# Patient Record
Sex: MALE | Race: WHITE | NOT HISPANIC OR LATINO | Employment: OTHER | URBAN - METROPOLITAN AREA
[De-identification: names, ages, dates, MRNs, and addresses within clinical notes are randomized per-mention and may not be internally consistent; named-entity substitution may affect disease eponyms.]

---

## 2017-03-20 ENCOUNTER — OFFICE VISIT - HEALTHEAST (OUTPATIENT)
Dept: FAMILY MEDICINE | Facility: CLINIC | Age: 57
End: 2017-03-20

## 2017-03-20 DIAGNOSIS — E66.9 OBESITY, UNSPECIFIED: ICD-10-CM

## 2017-03-20 DIAGNOSIS — Z12.5 PROSTATE CANCER SCREENING: ICD-10-CM

## 2017-03-20 DIAGNOSIS — N20.0 KIDNEY STONE: ICD-10-CM

## 2017-03-20 DIAGNOSIS — Z00.00 ROUTINE GENERAL MEDICAL EXAMINATION AT A HEALTH CARE FACILITY: ICD-10-CM

## 2017-03-20 DIAGNOSIS — E78.00 PURE HYPERCHOLESTEROLEMIA: ICD-10-CM

## 2017-03-20 ASSESSMENT — MIFFLIN-ST. JEOR: SCORE: 2049.57

## 2017-03-21 LAB
CHOLEST SERPL-MCNC: 231 MG/DL
FASTING STATUS PATIENT QL REPORTED: YES
HDLC SERPL-MCNC: 50 MG/DL
LDLC SERPL CALC-MCNC: 143 MG/DL
PSA SERPL-MCNC: 0.9 NG/ML (ref 0–3.5)
TRIGL SERPL-MCNC: 192 MG/DL

## 2017-03-22 ENCOUNTER — COMMUNICATION - HEALTHEAST (OUTPATIENT)
Dept: FAMILY MEDICINE | Facility: CLINIC | Age: 57
End: 2017-03-22

## 2017-11-03 ENCOUNTER — RECORDS - HEALTHEAST (OUTPATIENT)
Dept: ADMINISTRATIVE | Facility: OTHER | Age: 57
End: 2017-11-03

## 2020-06-10 ENCOUNTER — COMMUNICATION - HEALTHEAST (OUTPATIENT)
Dept: SCHEDULING | Facility: CLINIC | Age: 60
End: 2020-06-10

## 2020-07-13 ENCOUNTER — AMBULATORY - HEALTHEAST (OUTPATIENT)
Dept: NURSING | Facility: CLINIC | Age: 60
End: 2020-07-13

## 2020-07-13 ENCOUNTER — OFFICE VISIT - HEALTHEAST (OUTPATIENT)
Dept: FAMILY MEDICINE | Facility: CLINIC | Age: 60
End: 2020-07-13

## 2020-07-13 DIAGNOSIS — T14.8XXA PUNCTURE WOUND: ICD-10-CM

## 2020-07-13 DIAGNOSIS — S99.929A FOOT INJURY: ICD-10-CM

## 2020-08-18 ENCOUNTER — COMMUNICATION - HEALTHEAST (OUTPATIENT)
Dept: FAMILY MEDICINE | Facility: CLINIC | Age: 60
End: 2020-08-18

## 2020-08-24 ENCOUNTER — OFFICE VISIT - HEALTHEAST (OUTPATIENT)
Dept: FAMILY MEDICINE | Facility: CLINIC | Age: 60
End: 2020-08-24

## 2020-08-24 DIAGNOSIS — I10 BENIGN ESSENTIAL HYPERTENSION: ICD-10-CM

## 2020-08-24 DIAGNOSIS — K42.9 UMBILICAL HERNIA WITHOUT OBSTRUCTION AND WITHOUT GANGRENE: ICD-10-CM

## 2020-08-24 DIAGNOSIS — Z13.1 SCREENING FOR DIABETES MELLITUS: ICD-10-CM

## 2020-08-24 DIAGNOSIS — E78.5 HYPERLIPIDEMIA LDL GOAL <100: ICD-10-CM

## 2020-08-24 DIAGNOSIS — Z00.01 ENCOUNTER FOR ROUTINE ADULT HEALTH EXAMINATION WITH ABNORMAL FINDINGS: ICD-10-CM

## 2020-08-24 DIAGNOSIS — Z11.59 ENCOUNTER FOR HEPATITIS C SCREENING TEST FOR LOW RISK PATIENT: ICD-10-CM

## 2020-08-24 LAB
ALBUMIN SERPL-MCNC: 4.3 G/DL (ref 3.5–5)
ALP SERPL-CCNC: 38 U/L (ref 45–120)
ALT SERPL W P-5'-P-CCNC: 16 U/L (ref 0–45)
ANION GAP SERPL CALCULATED.3IONS-SCNC: 11 MMOL/L (ref 5–18)
AST SERPL W P-5'-P-CCNC: 17 U/L (ref 0–40)
BILIRUB SERPL-MCNC: 0.5 MG/DL (ref 0–1)
BUN SERPL-MCNC: 17 MG/DL (ref 8–22)
CALCIUM SERPL-MCNC: 9.4 MG/DL (ref 8.5–10.5)
CHLORIDE BLD-SCNC: 105 MMOL/L (ref 98–107)
CHOLEST SERPL-MCNC: 210 MG/DL
CO2 SERPL-SCNC: 24 MMOL/L (ref 22–31)
CREAT SERPL-MCNC: 1.01 MG/DL (ref 0.7–1.3)
FASTING STATUS PATIENT QL REPORTED: YES
GFR SERPL CREATININE-BSD FRML MDRD: >60 ML/MIN/1.73M2
GLUCOSE BLD-MCNC: 92 MG/DL (ref 70–125)
HDLC SERPL-MCNC: 54 MG/DL
LDLC SERPL CALC-MCNC: 125 MG/DL
POTASSIUM BLD-SCNC: 4.3 MMOL/L (ref 3.5–5)
PROT SERPL-MCNC: 7.3 G/DL (ref 6–8)
SODIUM SERPL-SCNC: 140 MMOL/L (ref 136–145)
TRIGL SERPL-MCNC: 154 MG/DL

## 2020-08-24 ASSESSMENT — MIFFLIN-ST. JEOR: SCORE: 1975.85

## 2020-08-25 LAB — HCV AB SERPL QL IA: NEGATIVE

## 2020-08-30 ENCOUNTER — COMMUNICATION - HEALTHEAST (OUTPATIENT)
Dept: FAMILY MEDICINE | Facility: CLINIC | Age: 60
End: 2020-08-30

## 2020-09-28 ENCOUNTER — OFFICE VISIT - HEALTHEAST (OUTPATIENT)
Dept: FAMILY MEDICINE | Facility: CLINIC | Age: 60
End: 2020-09-28

## 2020-09-28 DIAGNOSIS — I10 ESSENTIAL HYPERTENSION, BENIGN: ICD-10-CM

## 2020-09-28 DIAGNOSIS — Z23 NEED FOR VACCINATION: ICD-10-CM

## 2020-09-28 RX ORDER — LOSARTAN POTASSIUM 50 MG/1
50 TABLET ORAL DAILY
Qty: 90 TABLET | Refills: 3 | Status: SHIPPED | OUTPATIENT
Start: 2020-09-28 | End: 2021-08-25

## 2020-09-28 ASSESSMENT — MIFFLIN-ST. JEOR: SCORE: 1969.05

## 2020-12-28 ENCOUNTER — OFFICE VISIT - HEALTHEAST (OUTPATIENT)
Dept: FAMILY MEDICINE | Facility: CLINIC | Age: 60
End: 2020-12-28

## 2020-12-28 DIAGNOSIS — Z23 NEED FOR VACCINATION: ICD-10-CM

## 2020-12-28 DIAGNOSIS — N52.9 ERECTILE DYSFUNCTION, UNSPECIFIED ERECTILE DYSFUNCTION TYPE: ICD-10-CM

## 2020-12-28 DIAGNOSIS — I10 BENIGN ESSENTIAL HYPERTENSION: ICD-10-CM

## 2020-12-28 LAB
ANION GAP SERPL CALCULATED.3IONS-SCNC: 10 MMOL/L (ref 5–18)
BUN SERPL-MCNC: 20 MG/DL (ref 8–22)
CALCIUM SERPL-MCNC: 9.5 MG/DL (ref 8.5–10.5)
CHLORIDE BLD-SCNC: 107 MMOL/L (ref 98–107)
CO2 SERPL-SCNC: 25 MMOL/L (ref 22–31)
CREAT SERPL-MCNC: 1.16 MG/DL (ref 0.7–1.3)
GFR SERPL CREATININE-BSD FRML MDRD: >60 ML/MIN/1.73M2
GLUCOSE BLD-MCNC: 72 MG/DL (ref 70–125)
POTASSIUM BLD-SCNC: 4.7 MMOL/L (ref 3.5–5)
SODIUM SERPL-SCNC: 142 MMOL/L (ref 136–145)

## 2020-12-28 RX ORDER — TADALAFIL 20 MG/1
20 TABLET ORAL DAILY PRN
Qty: 12 TABLET | Refills: 3 | Status: SHIPPED | OUTPATIENT
Start: 2020-12-28 | End: 2021-08-25

## 2020-12-31 ENCOUNTER — COMMUNICATION - HEALTHEAST (OUTPATIENT)
Dept: FAMILY MEDICINE | Facility: CLINIC | Age: 60
End: 2020-12-31

## 2021-04-13 ENCOUNTER — AMBULATORY - HEALTHEAST (OUTPATIENT)
Dept: NURSING | Facility: CLINIC | Age: 61
End: 2021-04-13

## 2021-05-04 ENCOUNTER — AMBULATORY - HEALTHEAST (OUTPATIENT)
Dept: NURSING | Facility: CLINIC | Age: 61
End: 2021-05-04

## 2021-05-30 VITALS — WEIGHT: 259 LBS | BODY MASS INDEX: 33.24 KG/M2 | HEIGHT: 74 IN

## 2021-06-04 VITALS
RESPIRATION RATE: 16 BRPM | HEIGHT: 74 IN | HEART RATE: 59 BPM | OXYGEN SATURATION: 100 % | DIASTOLIC BLOOD PRESSURE: 98 MMHG | SYSTOLIC BLOOD PRESSURE: 148 MMHG | WEIGHT: 244.5 LBS | TEMPERATURE: 98 F | BODY MASS INDEX: 31.38 KG/M2

## 2021-06-05 VITALS
HEART RATE: 80 BPM | OXYGEN SATURATION: 98 % | WEIGHT: 254.3 LBS | BODY MASS INDEX: 33.1 KG/M2 | DIASTOLIC BLOOD PRESSURE: 88 MMHG | SYSTOLIC BLOOD PRESSURE: 138 MMHG

## 2021-06-05 VITALS
DIASTOLIC BLOOD PRESSURE: 92 MMHG | BODY MASS INDEX: 31.18 KG/M2 | HEIGHT: 74 IN | OXYGEN SATURATION: 98 % | HEART RATE: 81 BPM | WEIGHT: 243 LBS | SYSTOLIC BLOOD PRESSURE: 140 MMHG

## 2021-06-09 NOTE — PROGRESS NOTES
"Tim Rutherford is a 60 y.o. male who is being evaluated via a billable telephone visit.      The patient has been notified of following:     \"This telephone visit will be conducted via a call between you and your physician/provider. We have found that certain health care needs can be provided without the need for a physical exam.  This service lets us provide the care you need with a short phone conversation.  If a prescription is necessary we can send it directly to your pharmacy.  If lab work is needed we can place an order for that and you can then stop by our lab to have the test done at a later time.    Telephone visits are billed at different rates depending on your insurance coverage. During this emergency period, for some insurers they may be billed the same as an in-person visit.  Please reach out to your insurance provider with any questions.    If during the course of the call the physician/provider feels a telephone visit is not appropriate, you will not be charged for this service.\"    Patient has given verbal consent to a Telephone visit? Yes    What phone number would you like to be contacted at? [phone]    Patient would like to receive their AVS by Mail    Additional provider notes:        SUBJECTIVE:  Tim Rutherford is a 60 y.o. male  who presents for foot injury.     The patient stepped onto a board that had 2.5 inch spike in it. It went through the arch on his foot. They did have some redness around the puncture wound, that is getting smaller. He can put more weight on this now than he could. His foot is somewhat swollen which is why they are calling. They are wondering about an antibiotic to help with this. It feels better today than the last two days. He has had no swelling into the leg. He has had no fevers. He has had no body aches.   Chief Complaint   Patient presents with     Foot Injury     On Friday patient stepped on a board that had a 2.5\" spike attached to it. The spike went " through his shoe and into the center of his right foot. The spike was removed. Patient reports that the redness and swelling has gone down since Friday. No fevers and no discharge.          Patient Active Problem List   Diagnosis     Obesity     Essential Hypercholesterolemia     Kidney stone     Elevated BP       Current Outpatient Medications   Medication Sig Dispense Refill     multivit-min/FA/lycopen/lutein (CENTRUM SILVER MEN ORAL) Take by mouth daily.       cephalexin (KEFLEX) 500 MG capsule Take 1 capsule (500 mg total) by mouth 3 (three) times a day for 10 days. 30 capsule 0     No current facility-administered medications for this visit.        Social History     Tobacco Use   Smoking Status Never Smoker   Smokeless Tobacco Never Used           OBJECTIVE:        No results found for this or any previous visit (from the past 240 hour(s)).    There were no vitals filed for this visit.  No data recorded       Assessment/Plan:  1. Puncture wound  -Discussed that recommendation is to at least update his tetanus as we only have 2 documented tetanus shots in the system. 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   -f/u if worsening.   - Tdap vaccine,  8yo or older,  IM; Future  - cephalexin (KEFLEX) 500 MG capsule; Take 1 capsule (500 mg total) by mouth 3 (three) times a day for 10 days.  Dispense: 30 capsule; Refill: 0        Phone call duration:  10 minutes    Trinity Young MD

## 2021-06-09 NOTE — PROGRESS NOTES
"   Visit Vitals     BP (!) 150/100     Pulse 84     Resp 16     Ht 6' 2\" (1.88 m)     Wt (!) 259 lb (117.5 kg)     BMI 33.25 kg/m2       Tim Rutherford is a 56 y.o. male here for physical.  He has no specific concerns about his health.  He is doing some walking but knows that his weight is up and he needs to work harder at diet exercise and weight loss.  We reviewed his elevated blood pressure here today and the potential need for starting medications.  Encouraged him on blood pressure monitoring at least weekly while working at low-salt diet.  We reviewed heart disease prevention and cancer detection.  He is having no prostate symptoms.  Active Ambulatory Problems     Diagnosis Date Noted     Obesity      Essential Hypercholesterolemia      Kidney stone 03/20/2017     Elevated BP 03/20/2017     Resolved Ambulatory Problems     Diagnosis Date Noted     No Resolved Ambulatory Problems     Past Medical History:   Diagnosis Date     Essential hypertension, malignant      Hypercholesterolemia      Nasal septal deviation      History reviewed. No pertinent surgical history.  Family History   Problem Relation Age of Onset     Alcohol abuse Paternal Grandfather      Hyperlipidemia Mother      Glaucoma Mother      Leukemia Daughter      No current outpatient prescriptions on file.  No Known Allergies  Immunization History   Administered Date(s) Administered     Td, historic 02/20/2007     Tdap 02/20/2007, 03/20/2017     Social History     Social History     Marital status:      Spouse name: N/A     Number of children: N/A     Years of education: N/A     Occupational History     Not on file.     Social History Main Topics     Smoking status: Not on file     Smokeless tobacco: Not on file     Alcohol use Not on file     Drug use: Not on file     Sexual activity: Not on file     Other Topics Concern     Not on file     Social History Narrative     No narrative on file     Habits: He is a non-smoker rare alcohol user " does use caffeine about 3 times a day  His review of systems all otherwise negative    General Appearance:    Alert, cooperative, no distress, appears stated age   Head:    Normocephalic, without obvious abnormality, atraumatic   Eyes:    PERRL, conjunctiva/corneas clear, EOM's intact, fundi     benign, both eyes        Ears:    Normal TM's and external ear canals, both ears   Nose:   Nares normal, septum midline, mucosa normal, no drainage    or sinus tenderness   Throat:   Lips, mucosa, and tongue normal; teeth and gums normal   Neck:   Supple, symmetrical, trachea midline, no adenopathy;        thyroid:  No enlargement/tenderness/nodules; no carotid    bruit or JVD   Back:     Symmetric, no curvature, ROM normal, no CVA tenderness   Lungs:     Clear to auscultation bilaterally, respirations unlabored   Chest wall:    No tenderness or deformity   Heart:    Regular rate and rhythm, S1 and S2 normal, no murmur, rub   or gallop   Abdomen:     Soft, non-tender, bowel sounds active all four quadrants,     no masses, no organomegaly   Genitalia:    Normal male without hernia or other abnormality    Rectal:    Normal tone, mild prostate enlargement otherwise normal    Extremities:   Extremities normal, atraumatic, no cyanosis or edema   Pulses:   2+ and symmetric all extremities   Skin:   Skin color, texture, turgor normal, no rashes or lesions   Lymph nodes:   Cervical, supraclavicular, and axillary nodes normal   Neurologic:   CNII-XII intact. Normal strength, sensation and reflexes       throughout    Labs pending    Assessment: General physical elevated blood pressure hypercholesterolemia obesity history of kidney stones    Plan: Okay for tetanus update we will call with labs when available I have asked him to monitor blood pressures at least weekly and follow-up here in 1 month if his blood pressure remains high he will need to start medication and he is aware that.  Work at diet exercise and weight loss and  follow-up here otherwise in 6 months if his blood pressure remains an issue.

## 2021-06-10 NOTE — TELEPHONE ENCOUNTER
Who is calling:  wife  Reason for Call:  Calling regarding some chagres for a phone conversation that was not supposed to be charged to the patient. Please advise.  Date of last appointment with primary care: 07/13/20  Okay to leave a detailed message: Yes

## 2021-06-10 NOTE — TELEPHONE ENCOUNTER
Virtual visit 7/13/20. Pt told to come in for Tdap vaccine. Pt though the virtual visit would not be charged. Pt was notified that he completed a virtual visit and it is billed as that. Vaccine was additional stop into the clinic.     Wife had additional questions as to why other family members have had lower visit costs, virtually. I told her it depends on the insurance coverage and other pt's situations.

## 2021-06-10 NOTE — PROGRESS NOTES
Assessment:      Healthy male exam.      1. Encounter for routine adult health examination with abnormal findings  The patient is retired  Walks most days 3 miles  Due for colon cancer screening 2021, 10-year plan    2. Encounter for hepatitis C screening test for low risk patient     - Hepatitis C Antibody (Anti-HCV)    3. Hyperlipidemia LDL goal <100  Last set of lipids 2017: 231/192/50/143  Patient has never been on medication for hyperlipidemia  No significant family history for premature cardiovascular disease    - Lipid [place] FASTING    4. Screening for diabetes mellitus     - Comprehensive Metabolic Panel    5. Benign essential hypertension  In 2017 the patient's blood pressure in the office was 140/100 with a plan for follow-up in the clinic to document further blood pressures as patient felt that he had whitecoat hypertension but failed to follow-up.  The patient states that about a year ago he had a life insurance physical exam and blood pressure was satisfactory when checked at home.  He has the ability to check his blood pressure at home and will have him do that and in about a month return to the clinic with his blood pressure unit and we will look at those blood pressure readings, compare his monitor to ours the results and images vision on starting medication.  Low-salt diet, continued efforts to exercise and goal of losing 20 pounds would be helpful.    6. Umbilical hernia without obstruction and without gangrene  Incidental finding of umbilical hernia which is small reducible and discussed with him at this point this needs no attention but if gets larger or symptomatic or if signs of strangulation or incarceration of the hernia and its an urgent visit to the emergency room.     Plan:       All questions answered.  Diagnosis explained in detail, including differential.  Discussed healthy lifestyle modifications.  Test results by letter     Subjective:      Tim Rutherford is a 60 y.o. male who  presents for an annual exam. The patient reports that there is not domestic violence in his life.     Healthy Habits:   Regular Exercise: Yes  Sunscreen Use: No  Healthy Diet: Yes  Dental Visits Regularly: Yes  Seat Belt: Yes  Sexually active: Yes  Monthly Self Testicular Exams:  Yes  Hemoccults: No  Flex Sig: No  Colonoscopy: Yes  Lipid Profile: Yes  Glucose Screen: Yes  Prevention of Osteoporosis: Yes  Last Dexa: No  Guns at Home:  No      Immunization History   Administered Date(s) Administered     Td,adult,historic,unspecified 02/20/2007     Tdap 02/20/2007, 03/20/2017, 07/13/2020     Immunization status: Discussed the shingles vaccine and he is going to check with his insurance to see if covered and if so then will give in the clinic       Current Outpatient Medications   Medication Sig Dispense Refill     multivit-min/FA/lycopen/lutein (CENTRUM SILVER MEN ORAL) Take by mouth daily.       No current facility-administered medications for this visit.      Past Medical History:   Diagnosis Date     Essential hypertension, malignant      Hypercholesterolemia      Nasal septal deviation      History reviewed. No pertinent surgical history.  Patient has no known allergies.  Family History   Problem Relation Age of Onset     Alcohol abuse Paternal Grandfather      Hyperlipidemia Mother      Glaucoma Mother      No Medical Problems Father         bladder cancer     Lymphoma Daughter         hodgkins     Social History     Socioeconomic History     Marital status:      Spouse name: Not on file     Number of children: Not on file     Years of education: Not on file     Highest education level: Not on file   Occupational History     Occupation: commercial construction     Employer: [employer]     Comment: retired   Social Needs     Financial resource strain: Not on file     Food insecurity     Worry: Not on file     Inability: Not on file     Transportation needs     Medical: Not on file     Non-medical: Not on  "file   Tobacco Use     Smoking status: Never Smoker     Smokeless tobacco: Never Used   Substance and Sexual Activity     Alcohol use: Not on file     Comment: less than 1 beer/week     Drug use: Never     Sexual activity: Yes     Partners: Female   Lifestyle     Physical activity     Days per week: 7 days     Minutes per session: 40 min     Stress: Not on file   Relationships     Social connections     Talks on phone: Not on file     Gets together: Not on file     Attends [employer] service: Not on file     Active member of club or organization: Not on file     Attends meetings of clubs or organizations: Not on file     Relationship status: Not on file     Intimate partner violence     Fear of current or ex partner: Not on file     Emotionally abused: Not on file     Physically abused: Not on file     Forced sexual activity: Not on file   Other Topics Concern     Not on file   Social History Narrative     Not on file       Review of Systems  Review of Systems   Constitutional: Negative.    HENT: Negative.    Eyes: Negative.    Respiratory: Negative.    Cardiovascular: Negative.    Gastrointestinal: Negative.    Endocrine: Negative.    Genitourinary: Negative.    Musculoskeletal: Negative.    Skin: Negative.    Allergic/Immunologic: Negative.    Neurological: Negative.    Hematological: Negative.    Psychiatric/Behavioral: Negative.              Objective:     Vitals:    08/24/20 0919 08/24/20 0923 08/24/20 0930   BP: (!) 160/100 (!) 160/100 (!) 148/98   Pulse: (!) 59     Resp: 16     Temp: 98  F (36.7  C)     SpO2: 100%     Weight: (!) 244 lb 8 oz (110.9 kg)     Height: 6' 1.5\" (1.867 m)       Body mass index is 31.82 kg/m .    Physical  Physical Exam  Vitals signs and nursing note reviewed.   Constitutional:       General: He is not in acute distress.     Appearance: Normal appearance. He is not ill-appearing.   HENT:      Head: Normocephalic and atraumatic.      Right Ear: Tympanic membrane, ear canal and external " ear normal.      Left Ear: Tympanic membrane, ear canal and external ear normal.      Nose: Nose normal.      Mouth/Throat:      Mouth: Mucous membranes are moist.      Pharynx: No oropharyngeal exudate or posterior oropharyngeal erythema.   Eyes:      Extraocular Movements: Extraocular movements intact.      Conjunctiva/sclera: Conjunctivae normal.      Pupils: Pupils are equal, round, and reactive to light.   Neck:      Musculoskeletal: Normal range of motion.      Vascular: No carotid bruit.   Cardiovascular:      Rate and Rhythm: Normal rate and regular rhythm.      Pulses: Normal pulses.      Heart sounds: Normal heart sounds. No murmur.   Pulmonary:      Effort: Pulmonary effort is normal.      Breath sounds: Normal breath sounds.   Abdominal:      General: Bowel sounds are normal. There is no distension.      Palpations: Abdomen is soft. There is no mass.      Hernia: A hernia is present.      Comments: Small reducible central umbilical hernia nontender   Musculoskeletal: Normal range of motion.      Right lower leg: No edema.      Left lower leg: No edema.   Lymphadenopathy:      Cervical: No cervical adenopathy.   Skin:     General: Skin is warm.      Capillary Refill: Capillary refill takes 2 to 3 seconds.      Findings: No lesion or rash.   Neurological:      General: No focal deficit present.      Mental Status: He is alert.      Cranial Nerves: No cranial nerve deficit.      Motor: No weakness.      Gait: Gait normal.   Psychiatric:         Mood and Affect: Mood normal.         Behavior: Behavior normal.         Thought Content: Thought content normal.         Judgment: Judgment normal.

## 2021-06-11 NOTE — PROGRESS NOTES
ASSESSMENT/PLAN:       1. Essential hypertension, benign    - losartan (COZAAR) 50 MG tablet; Take 1 tablet (50 mg total) by mouth daily.  Dispense: 90 tablet; Refill: 3  I did check his blood pressure again and did not get any lower readings than the nurse did and looking back through his record there are multiple readings that have been elevated but not acted on with pharmacologic intervention.  I explained to him that I think it is time that he starts a blood pressure medicine and it like to start losartan.  He is aware that it is a lower dose starting dose and may need to be increased.  Discussed potential side effects and benefits.  When he returns in about 2 months for blood pressure recheck we will need to recheck his basic metabolic panel to make sure his creatinine is stable and that his potassium is okay.  Spent considerable time with the patient educating him about hypertension and that we are not starting this because he has any particular symptoms but rather to prevent symptoms and serious conditions such as stroke and heart attack.  He should continue to try to lose weight, exercise and restrict his salt intake.    2. Need for vaccination    - Varicella Zoster, Recombinant Vaccine IM  - Influenza, Recombinant, Inj, Quadrivalent, PF, 18+YRS    In 2 months when he returns we will give him his shingles booster.  Also in 2 months we will need to recheck blood pressure with a face-to-face visit and check creatinine and electrolytes.  15 minutes spent with the patient total with greater than 50% of my time being spent in counseling in regard to his hypertension and treatment plan.      Alberto Rousseau MD      PROGRESS NOTE   10/1/2020    SUBJECTIVE:  Tim Rutherford is a 60 y.o. male  who presents for   Chief Complaint   Patient presents with     Blood Pressure Check     BP has been running high      1. Essential hypertension, benign    The patient is here today for follow-up of high blood pressure that  "was noted at the time of his preventative healthcare visit.  He has been monitoring his blood pressure at home with a digital unit that is old and he has been getting high readings often in the 160s over 100 -110 diastolic.  Do note that his blood pressure monitor which he brought with him today is reading significantly higher than our monitor.  The patient does not have any symptoms of chest pain shortness of breath or headaches.  He is reluctant to start medicine but understands if it is necessary that he will do it.  States that he is not a pill taker.  Note the patient does also have mild hyperlipidemia.    2. Need for vaccination  The patient is in need of shingles vaccine which he has checked and his insurance covers that and also would like an influenza vaccine    Patient Active Problem List   Diagnosis     Obesity     Essential Hypercholesterolemia     Kidney stone     Elevated BP       Current Outpatient Medications   Medication Sig Dispense Refill     multivit-min/FA/lycopen/lutein (CENTRUM SILVER MEN ORAL) Take by mouth daily.       losartan (COZAAR) 50 MG tablet Take 1 tablet (50 mg total) by mouth daily. 90 tablet 3     No current facility-administered medications for this visit.        Social History     Tobacco Use   Smoking Status Never Smoker   Smokeless Tobacco Never Used           OBJECTIVE:        No results found for this or any previous visit (from the past 240 hour(s)).    Vitals:    09/28/20 0903 09/28/20 0905   BP: (!) 130/98 (!) 140/92   Pulse: 81    SpO2: 98%    Weight: (!) 243 lb (110.2 kg)    Height: 6' 1.5\" (1.867 m)      Weight: (!) 243 lb (110.2 kg)          Physical Exam:  GENERAL APPEARANCE: 60 year old male, NAD, well hydrated, well nourished  SKIN:  Normal skin turgor, no lesions/rashes   HEENT: moist mucous membranes, no rhinorrhea  NECK: Normal without adenopathy or masses  CV: RRR, no M/G/R   LUNGS: CTAB  ABDOMEN: S&NT, no masses or enlarged organs   EXTREMITY: no edema and " full ROM of all joints  NEURO: no focal findings

## 2021-06-14 NOTE — PROGRESS NOTES
ASSESSMENT/PLAN:       1. Benign essential hypertension    - Basic Metabolic Panel, note that his electrolytes and kidney function are satisfactory.  We will continue with losartan 50 mg daily    2. Erectile dysfunction, unspecified erectile dysfunction type  The patient had questions about checking testosterone levels and we had a discussion about that.  Most likely the erectile dysfunction is more of a vascular issue rather than a hormonal issue.  Most the time decreased testosterone affects more libido than erectile function.  He does not have other symptoms that support low testosterone.  Thus we decided not to check that today.  Also of note is that weight loss so that he is got his BMI below 30 can be helpful to raise testosterone levels.  Discussed with the patient good Rx marcie and using a coupon to purchase the Cialis.  Discussed potential side effects and reasons to stop taking the medication.  Could start out trying a half a tablet a good hour before intercourse.  If this is effective and cost is not prohibitive certainly could supply more than 12 tablets per prescription.    - tadalafiL (CIALIS) 20 MG tablet; Take 1 tablet (20 mg total) by mouth daily as needed for erectile dysfunction.  Dispense: 12 tablet; Refill: 3    3. Need for vaccination    - Varicella Zoster, Recombinant Vaccine IM    We will call patient with test results  Follow-up in 6 months for chronic disease visit and also at that time we will need to order his colonoscopy.  We will want to recheck his lipids at that time as well    Ablerto Rousseau MD      PROGRESS NOTE   12/31/2020    SUBJECTIVE:  Tim Rutherford is a 60 y.o. male  who presents for   Chief Complaint   Patient presents with     Follow-up     blood pressure and med check     The patient is here today for a blood pressure follow-up since starting losartan 50 mg daily    1. Benign essential hypertension  The patient has not had any side effects from starting losartan although  possibly some increased problems with erectile function.  The patient's blood pressure is improved today.  He does not have any muscle cramping or lightheadedness.    2. Erectile dysfunction, unspecified erectile dysfunction type  The patient has noticed slowly progressive decrease in quality and sustainability of his erections.  Libido is fine and often initially the erection is satisfactory for vaginal intercourse but not sustained  There is no pain with his erections and no problems with ejaculation.    3. Need for vaccination  The patient is due for his second shingles vaccine    Patient Active Problem List   Diagnosis     Obesity     Essential Hypercholesterolemia     Kidney stone     Elevated BP       Current Outpatient Medications   Medication Sig Dispense Refill     losartan (COZAAR) 50 MG tablet Take 1 tablet (50 mg total) by mouth daily. 90 tablet 3     multivit-min/FA/lycopen/lutein (CENTRUM SILVER MEN ORAL) Take by mouth daily.       tadalafiL (CIALIS) 20 MG tablet Take 1 tablet (20 mg total) by mouth daily as needed for erectile dysfunction. 12 tablet 3     No current facility-administered medications for this visit.        Social History     Tobacco Use   Smoking Status Never Smoker   Smokeless Tobacco Never Used           OBJECTIVE:        Recent Results (from the past 240 hour(s))   Basic Metabolic Panel   Result Value Ref Range    Sodium 142 136 - 145 mmol/L    Potassium 4.7 3.5 - 5.0 mmol/L    Chloride 107 98 - 107 mmol/L    CO2 25 22 - 31 mmol/L    Anion Gap, Calculation 10 5 - 18 mmol/L    Glucose 72 70 - 125 mg/dL    Calcium 9.5 8.5 - 10.5 mg/dL    BUN 20 8 - 22 mg/dL    Creatinine 1.16 0.70 - 1.30 mg/dL    GFR MDRD Af Amer >60 >60 mL/min/1.73m2    GFR MDRD Non Af Amer >60 >60 mL/min/1.73m2       Vitals:    12/28/20 1003   BP: 138/88   Pulse: 80   SpO2: 98%   Weight: (!) 254 lb 4.8 oz (115.3 kg)     Weight: (!) 254 lb 4.8 oz (115.3 kg)          Physical Exam:  GENERAL APPEARANCE: 60-year-old  male here with his wife, NAD, well hydrated, well nourished  SKIN:  Normal skin turgor, no lesions/rashes   HEENT: moist mucous membranes, no rhinorrhea  NECK: Normal without adenopathy or masses  EXTREMITY: no edema and full ROM of all joints  NEURO: no focal findings

## 2021-06-14 NOTE — PROGRESS NOTES
Please call the patient and inform him that his metabolic panel was completely normal.  No concerns with use of the losartan.  Particularly kidney function and electrolytes such as the potassium look good.  No changes in medication and let me know if he has other questions or concerns.  Thank you,  Dr. Rousseau

## 2021-06-14 NOTE — TELEPHONE ENCOUNTER
----- Message from Alberto Rousseau MD sent at 12/31/2020 10:08 AM CST -----  Please call the patient and inform him that his metabolic panel was completely normal.  No concerns with use of the losartan.  Particularly kidney function and electrolytes such as the potassium look good.  No changes in medication and let me know if he has other questions or concerns.  Thank you,  Dr. Rousseau

## 2021-06-15 PROBLEM — N20.0 KIDNEY STONE: Status: ACTIVE | Noted: 2017-03-20

## 2021-06-20 NOTE — LETTER
Letter by Alberto Rousseau MD at      Author: Alberto Rousseau MD Service: -- Author Type: --    Filed:  Encounter Date: 8/30/2020 Status: (Other)         Tim Rutherford  [address] [facility] [address]             August 30, 2020         Dear Mr. Rutherford,    Below are the results from your recent visit:    Resulted Orders   Lipid [place] FASTING   Result Value Ref Range    Cholesterol 210 (H) <=199 mg/dL    Triglycerides 154 (H) <=149 mg/dL    HDL Cholesterol 54 >=40 mg/dL    LDL Calculated 125 <=129 mg/dL    Patient Fasting > 8hrs? Yes    Comprehensive Metabolic Panel   Result Value Ref Range    Sodium 140 136 - 145 mmol/L    Potassium 4.3 3.5 - 5.0 mmol/L    Chloride 105 98 - 107 mmol/L    CO2 24 22 - 31 mmol/L    Anion Gap, Calculation 11 5 - 18 mmol/L    Glucose 92 70 - 125 mg/dL    BUN 17 8 - 22 mg/dL    Creatinine 1.01 0.70 - 1.30 mg/dL    GFR MDRD Af Amer >60 >60 mL/min/1.73m2    GFR MDRD Non Af Amer >60 >60 mL/min/1.73m2    Bilirubin, Total 0.5 0.0 - 1.0 mg/dL    Calcium 9.4 8.5 - 10.5 mg/dL    Protein, Total 7.3 6.0 - 8.0 g/dL    Albumin 4.3 3.5 - 5.0 g/dL    Alkaline Phosphatase 38 (L) 45 - 120 U/L    AST 17 0 - 40 U/L    ALT 16 0 - 45 U/L    Narrative    Fasting Glucose reference range is 70-99 mg/dL per  American Diabetes Association (ADA) guidelines.   Hepatitis C Antibody (Anti-HCV)   Result Value Ref Range    Hepatitis C Ab Negative Negative       Tim,  It was a pleasure to see you in the clinic last week.  The test results are above and I would like to review those results.    The lipid cascade shows that you are total cholesterol and triglycerides are mildly elevated.  These values are better than they were the last time you had a checked.  The HDL cholesterol is the good cholesterol and the LDL cholesterol is the bad cholesterol and those values are satisfactory.  Regular exercise along with losing 15 to 20 pounds would definitely help your cholesterol and triglycerides.    The metabolic  panel shows that your electrolytes are normal.  The glucose is a screening test for diabetes which was normal.  BUN, creatinine and GFR look at kidney function which is normal.  Your calcium level is normal.  The remainder of the tests look at liver function all of which are normal.  You will notice that the alkaline phosphatase level is low but we only worry about that when the value is elevated.    The screening for hepatitis C was negative.    You have an appointment to see me September 28 and I look forward to seeing you at that time and being able to see your blood pressure readings.  Just a reminder to bring your home blood pressure unit with you so we can check its accuracy.    Please call with questions or contact us using [employer].    Best Regards,        Electronically signed by Alberto Rousseau MD

## 2021-06-28 ENCOUNTER — OFFICE VISIT - HEALTHEAST (OUTPATIENT)
Dept: FAMILY MEDICINE | Facility: CLINIC | Age: 61
End: 2021-06-28

## 2021-06-28 DIAGNOSIS — N52.9 ERECTILE DYSFUNCTION, UNSPECIFIED ERECTILE DYSFUNCTION TYPE: ICD-10-CM

## 2021-06-28 DIAGNOSIS — I10 ESSENTIAL HYPERTENSION, BENIGN: ICD-10-CM

## 2021-06-28 DIAGNOSIS — E78.00 PURE HYPERCHOLESTEROLEMIA: ICD-10-CM

## 2021-06-28 RX ORDER — TADALAFIL 20 MG/1
20 TABLET ORAL DAILY PRN
Qty: 30 TABLET | Refills: 3 | Status: SHIPPED | OUTPATIENT
Start: 2021-06-28 | End: 2021-08-25

## 2021-06-28 RX ORDER — LOSARTAN POTASSIUM 100 MG/1
100 TABLET ORAL DAILY
Qty: 90 TABLET | Refills: 3 | Status: SHIPPED | OUTPATIENT
Start: 2021-06-28 | End: 2021-08-10

## 2021-06-28 ASSESSMENT — MIFFLIN-ST. JEOR: SCORE: 2005.34

## 2021-07-06 VITALS
OXYGEN SATURATION: 97 % | DIASTOLIC BLOOD PRESSURE: 88 MMHG | HEIGHT: 74 IN | WEIGHT: 251 LBS | SYSTOLIC BLOOD PRESSURE: 130 MMHG | BODY MASS INDEX: 32.21 KG/M2 | HEART RATE: 67 BPM

## 2021-07-07 NOTE — PROGRESS NOTES
ASSESSMENT/PLAN:       1. Essential hypertension, benign  I would like to increase his losartan to 100 mg daily  He can finish his 50 mg tablets by taking 2 of them daily  In 4 weeks I would like for him to return for some lab work and also a blood pressure recheck    - losartan (COZAAR) 100 MG tablet; Take 1 tablet (100 mg total) by mouth daily.  Dispense: 90 tablet; Refill: 3  - Basic Metabolic Panel; Future    2. Erectile dysfunction, unspecified erectile dysfunction type    - tadalafiL (CIALIS) 20 MG tablet; Take 1 tablet (20 mg total) by mouth daily as needed for erectile dysfunction.  Dispense: 30 tablet; Refill: 3    3. Essential Hypercholesterolemia  In 4 weeks when he returns at like to check his lipids fasting    - Lipid Profile; Future    Level of medical decision making moderate  1 condition that is a chronic condition not controlled along with 2 other chronic stable conditions  Amount of data reviewed was limited in regard to looking at past laboratory testing  Risk of complications moderate requiring prescription drug management  Test results by letter  Follow-up 6 months chronic disease visit and monitoring of blood pressure    Alberto Rousseau MD      PROGRESS NOTE   6/29/2021    SUBJECTIVE:  Tim Rutherford is a 61 y.o. male  who presents for   Chief Complaint   Patient presents with     Medication Refill     med check new medications , not fasting      1. Essential hypertension, benign  The patient is seen today for follow-up on his hypertension.  He has been taking losartan 50 mg daily and his blood pressure is high to borderline high today.  He is tolerating the losartan without any difficulty.  He does not monitor his blood pressure out of office.    2. Erectile dysfunction, unspecified erectile dysfunction type  The patient has had success with Cialis.  Requesting a refill and wishing to get a larger amount at that time.  With good Rx its very reasonable price wise  No side effects from the  "medication    3. Essential Hypercholesterolemia  The patient is not on any medication for his lipids and in August 2020 his numbers were 210/154/54/125  The patient has been walking and biking regularly    Patient Active Problem List   Diagnosis     Obesity     Essential Hypercholesterolemia     Kidney stone     Elevated BP       Current Outpatient Medications   Medication Sig Dispense Refill     losartan (COZAAR) 100 MG tablet Take 1 tablet (100 mg total) by mouth daily. 90 tablet 3     multivit-min/FA/lycopen/lutein (CENTRUM SILVER MEN ORAL) Take by mouth daily.       tadalafiL (CIALIS) 20 MG tablet Take 1 tablet (20 mg total) by mouth daily as needed for erectile dysfunction. 30 tablet 3     No current facility-administered medications for this visit.        Social History     Tobacco Use   Smoking Status Never Smoker   Smokeless Tobacco Never Used           OBJECTIVE:        No results found for this or any previous visit (from the past 240 hour(s)).    Vitals:    06/28/21 1006 06/28/21 1010   BP: 148/88 130/88   Patient Site: Right Arm    Patient Position: Sitting    Cuff Size: Adult Large    Pulse:  67   SpO2:  97%   Weight:  (!) 251 lb (113.9 kg)   Height:  6' 1.5\" (1.867 m)     Weight: (!) 251 lb (113.9 kg)          Physical Exam:  GENERAL APPEARANCE: 61-year-old male pleasant, NAD, well hydrated, well nourished  SKIN:  Normal skin turgor, no lesions/rashes   HEENT: moist mucous membranes, no rhinorrhea  NECK: Normal without adenopathy or masses  CV: RRR, no M/G/R   LUNGS: CTAB  ABDOMEN: S&NT, no masses or enlarged organs   EXTREMITY: no edema and full ROM of all joints  NEURO: no focal findings    "

## 2021-07-20 ENCOUNTER — TRANSFERRED RECORDS (OUTPATIENT)
Dept: HEALTH INFORMATION MANAGEMENT | Facility: CLINIC | Age: 61
End: 2021-07-20

## 2021-08-10 ENCOUNTER — TELEPHONE (OUTPATIENT)
Dept: FAMILY MEDICINE | Facility: CLINIC | Age: 61
End: 2021-08-10

## 2021-08-10 ENCOUNTER — LAB (OUTPATIENT)
Dept: LAB | Facility: CLINIC | Age: 61
End: 2021-08-10
Payer: COMMERCIAL

## 2021-08-10 DIAGNOSIS — E78.00 PURE HYPERCHOLESTEROLEMIA: ICD-10-CM

## 2021-08-10 DIAGNOSIS — I10 ESSENTIAL HYPERTENSION, BENIGN: ICD-10-CM

## 2021-08-10 LAB
ANION GAP SERPL CALCULATED.3IONS-SCNC: 8 MMOL/L (ref 5–18)
BUN SERPL-MCNC: 19 MG/DL (ref 8–22)
CALCIUM SERPL-MCNC: 10 MG/DL (ref 8.5–10.5)
CHLORIDE BLD-SCNC: 108 MMOL/L (ref 98–107)
CHOLEST SERPL-MCNC: 224 MG/DL
CO2 SERPL-SCNC: 27 MMOL/L (ref 22–31)
CREAT SERPL-MCNC: 1.13 MG/DL (ref 0.7–1.3)
FASTING STATUS PATIENT QL REPORTED: YES
GFR SERPL CREATININE-BSD FRML MDRD: 70 ML/MIN/1.73M2
GLUCOSE BLD-MCNC: 99 MG/DL (ref 70–125)
HDLC SERPL-MCNC: 51 MG/DL
LDLC SERPL CALC-MCNC: 151 MG/DL
POTASSIUM BLD-SCNC: 4.4 MMOL/L (ref 3.5–5)
SODIUM SERPL-SCNC: 143 MMOL/L (ref 136–145)
TRIGL SERPL-MCNC: 110 MG/DL

## 2021-08-10 PROCEDURE — 36415 COLL VENOUS BLD VENIPUNCTURE: CPT

## 2021-08-10 PROCEDURE — 80061 LIPID PANEL: CPT

## 2021-08-10 PROCEDURE — 80048 BASIC METABOLIC PNL TOTAL CA: CPT

## 2021-08-10 RX ORDER — LOSARTAN POTASSIUM 100 MG/1
100 TABLET ORAL DAILY
Qty: 90 TABLET | Refills: 3 | Status: SHIPPED | OUTPATIENT
Start: 2021-08-10 | End: 2022-07-11

## 2021-08-10 NOTE — TELEPHONE ENCOUNTER
I reviewed an EKG rhythm strip from the time of his colonoscopy that was done recently.  It is a short strip and it shows what looks like a first-degree AV block and either a right or left bundle branch block.    Please let the patient know that I reviewed the rhythm strip.  I reviewed the colonoscopy report and they did not mention any particular concerns with the rhythm strip or that you were having symptoms.    If you are having symptoms of feeling lightheaded, dizzy weak or like you might be at risk of passing out then you certainly need to be seen urgently.  If you are not having any symptoms I do not feel that we have to do anything about the rhythm strip although it would be reasonable to do a full EKG with 12 leads instead of just one lead at your next visit.    Please let me know if he has other questions or concerns.    Dr. Rousseau

## 2021-08-10 NOTE — TELEPHONE ENCOUNTER
Patient walked into clinic today  With a few questions.    1. The wrong dose of losartan was sent to the wrong pharmacy..     100mg Losartan pended to be sent to [facility], not [facility].    2. Patient had a colonoscopy , and his EKG was abnormal. Copy of EKG strip placed on PCP desk, as well as a copy scanned into patients chart.     Will route to PCP for review.

## 2021-08-25 ENCOUNTER — OFFICE VISIT (OUTPATIENT)
Dept: FAMILY MEDICINE | Facility: CLINIC | Age: 61
End: 2021-08-25
Payer: COMMERCIAL

## 2021-08-25 VITALS
OXYGEN SATURATION: 97 % | DIASTOLIC BLOOD PRESSURE: 88 MMHG | HEART RATE: 70 BPM | SYSTOLIC BLOOD PRESSURE: 136 MMHG | BODY MASS INDEX: 32.21 KG/M2 | WEIGHT: 251 LBS | HEIGHT: 74 IN

## 2021-08-25 DIAGNOSIS — R94.31 ABNORMAL ELECTROCARDIOGRAM: Primary | ICD-10-CM

## 2021-08-25 DIAGNOSIS — N52.9 ERECTILE DYSFUNCTION, UNSPECIFIED ERECTILE DYSFUNCTION TYPE: ICD-10-CM

## 2021-08-25 DIAGNOSIS — Z86.0101 HX OF ADENOMATOUS COLONIC POLYPS: ICD-10-CM

## 2021-08-25 DIAGNOSIS — E78.00 PURE HYPERCHOLESTEROLEMIA: ICD-10-CM

## 2021-08-25 LAB
ATRIAL RATE - MUSE: 60 BPM
DIASTOLIC BLOOD PRESSURE - MUSE: NORMAL MMHG
INTERPRETATION ECG - MUSE: NORMAL
P AXIS - MUSE: 19 DEGREES
PR INTERVAL - MUSE: 188 MS
QRS DURATION - MUSE: 92 MS
QT - MUSE: 410 MS
QTC - MUSE: 410 MS
R AXIS - MUSE: -42 DEGREES
SYSTOLIC BLOOD PRESSURE - MUSE: NORMAL MMHG
T AXIS - MUSE: 25 DEGREES
VENTRICULAR RATE- MUSE: 60 BPM

## 2021-08-25 PROCEDURE — 93010 ELECTROCARDIOGRAM REPORT: CPT | Performed by: INTERNAL MEDICINE

## 2021-08-25 PROCEDURE — 99214 OFFICE O/P EST MOD 30 MIN: CPT | Performed by: FAMILY MEDICINE

## 2021-08-25 PROCEDURE — 93005 ELECTROCARDIOGRAM TRACING: CPT | Performed by: FAMILY MEDICINE

## 2021-08-25 RX ORDER — MULTIVIT-MIN/FA/LYCOPEN/LUTEIN .4-300-25
1 TABLET ORAL EVERY 24 HOURS
COMMUNITY
Start: 2021-07-20

## 2021-08-25 RX ORDER — TADALAFIL 20 MG/1
20 TABLET ORAL DAILY PRN
Qty: 30 TABLET | Refills: 3
Start: 2021-08-25 | End: 2022-07-11

## 2021-08-25 ASSESSMENT — MIFFLIN-ST. JEOR: SCORE: 2005.34

## 2021-08-27 PROBLEM — Z86.0101 HX OF ADENOMATOUS COLONIC POLYPS: Status: ACTIVE | Noted: 2021-08-27

## 2021-08-27 PROBLEM — N52.9 ERECTILE DYSFUNCTION, UNSPECIFIED ERECTILE DYSFUNCTION TYPE: Status: ACTIVE | Noted: 2021-08-27

## 2021-08-27 NOTE — PROGRESS NOTES
ASSESSMENT/PLAN:       1. Abnormal electrocardiogram    - EKG 12-lead, tracing only, patient does not have first-degree AV block or bundle branch block rather has a left axis deviation and otherwise normal.  The patient is walking 4 miles and biking 6 to 8 miles most days  Has no symptoms with exertion  No further evaluation needed    2. Erectile dysfunction, unspecified erectile dysfunction type    - tadalafil (CIALIS) 20 MG tablet; Take 1 tablet (20 mg) by mouth daily as needed (sexual activity)  Dispense: 30 tablet; Refill: 3    3. Hx of adenomatous colonic polyps  Follow-up colonoscopy 2026      4.  Hyperlipidemia  We talked about his lipids and that they are not as good as last year  The patient would prefer to not go on medication for his lipids and will continue to modify his diet with a low saturated fat diet.  Note that the patient's blood pressure is satisfactory and is treated with losartan 100 mg daily  Normal electrolytes and kidney function    The 10-year ASCVD risk score (Michael FERRIS Jr., et al., 2013) is: 13.1%    Values used to calculate the score:      Age: 61 years      Sex: Male      Is Non- : No      Diabetic: No      Tobacco smoker: No      Systolic Blood Pressure: 136 mmHg      Is BP treated: Yes      HDL Cholesterol: 51 mg/dL      Total Cholesterol: 224 mg/dL    Level of medical decision making moderate  To or more chronic conditions that were reviewed  2 unique blood tests that were previously ordered but reviewed now with the patient as well as his colonoscopy which was done recently and reviewed results.  Also reviewed the EKG strip from his colonoscopy.  Risk of complications moderate requiring prescription drug management  All results were reviewed  Follow-up already in the system for December 2021      Health Maintenance Due   Topic     Flu Vaccine (1)       Alberto Rousseau MD      PROGRESS NOTE   8/27/2021    SUBJECTIVE:  [id number]  who presents for   Chief Complaint    Patient presents with     RECHECK     pt was in for a colonoscopy had a irregular EKG doing follow up further check up 12 lead EKG      The patient was seen today because in July 2021 he had a colonoscopy and had an abnormal rhythm strip.  He had dropped the rhythm strip off for us to look at and for my evaluation of the rhythm strip that showed possibly a first-degree AV block with a bundle branch block.  He was told to come in to have that further evaluated.    The colonoscopy showed 2 polyps that were removed and he is on a 5-year follow-up plan    He is requesting a refill on his Cialis    The patient had some blood work done a couple weeks ago and that included a basic metabolic panel which was essentially normal.  His lipid profile showed values of 224/110/51/151    Patient Active Problem List   Diagnosis     Obesity     Essential Hypercholesterolemia     Kidney stone     Elevated BP       Current Outpatient Medications   Medication Sig Dispense Refill     losartan (COZAAR) 100 MG tablet Take 1 tablet (100 mg) by mouth daily 90 tablet 3     Multiple Vitamins-Minerals (CENTRUM SILVER ADULT 50+) TABS Take 1 tablet by mouth every 24 hours       tadalafil (CIALIS) 20 MG tablet Take 1 tablet (20 mg) by mouth daily as needed (sexual activity) 30 tablet 3       History   Smoking Status     Never Smoker   Smokeless Tobacco     Never Used           OBJECTIVE:        Recent Results (from the past 120 hour(s))   EKG 12-lead, tracing only    Collection Time: 08/25/21  9:53 AM   Result Value Ref Range    Systolic Blood Pressure  mmHg    Diastolic Blood Pressure  mmHg    Ventricular Rate 60 BPM    Atrial Rate 60 BPM    NY Interval 188 ms    QRS Duration 92 ms     ms    QTc 410 ms    P Axis 19 degrees    R AXIS -42 degrees    T Axis 25 degrees    Interpretation ECG       Sinus rhythm  Left axis deviation  Abnormal ECG  No previous ECGs available  Confirmed by DANELLE THOMASON MD LOC:[facility] ([id number]) on 8/25/2021 2:30:01 PM    "      Vitals:    08/25/21 0941 08/25/21 1003   BP: 130/88 136/88   BP Location:  Left arm   Patient Position:  Sitting   Cuff Size:  Adult Large   Pulse: 70    SpO2: 97%    Weight: 113.9 kg (251 lb)    Height: 1.867 m (6' 1.5\")      Wt Readings from Last 3 Encounters:   08/25/21 113.9 kg (251 lb)   06/28/21 113.9 kg (251 lb)   12/28/20 115.3 kg (254 lb 4.8 oz)           Physical Exam:  GENERAL APPEARANCE: 61-year-old male, NAD, well hydrated, well nourished  SKIN:  Normal skin turgor, no lesions/rashes   HEENT: moist mucous membranes, no rhinorrhea  NECK: Normal without adenopathy or masses  CV: RRR, no M/G/R   LUNGS: CTAB  EXTREMITY: no edema and full ROM of all joints  NEURO: no focal findings    "

## 2022-07-10 PROBLEM — D12.4 BENIGN NEOPLASM OF DESCENDING COLON: Status: ACTIVE | Noted: 2021-07-22

## 2022-07-11 ENCOUNTER — OFFICE VISIT (OUTPATIENT)
Dept: FAMILY MEDICINE | Facility: CLINIC | Age: 62
End: 2022-07-11
Payer: COMMERCIAL

## 2022-07-11 VITALS
DIASTOLIC BLOOD PRESSURE: 94 MMHG | RESPIRATION RATE: 14 BRPM | WEIGHT: 261.4 LBS | OXYGEN SATURATION: 97 % | HEIGHT: 74 IN | BODY MASS INDEX: 33.55 KG/M2 | SYSTOLIC BLOOD PRESSURE: 152 MMHG | HEART RATE: 77 BPM

## 2022-07-11 DIAGNOSIS — N52.9 ERECTILE DYSFUNCTION, UNSPECIFIED ERECTILE DYSFUNCTION TYPE: ICD-10-CM

## 2022-07-11 DIAGNOSIS — D12.4 BENIGN NEOPLASM OF DESCENDING COLON: ICD-10-CM

## 2022-07-11 DIAGNOSIS — Z00.00 ANNUAL PHYSICAL EXAM: Primary | ICD-10-CM

## 2022-07-11 DIAGNOSIS — E78.00 PURE HYPERCHOLESTEROLEMIA: ICD-10-CM

## 2022-07-11 DIAGNOSIS — I10 ESSENTIAL HYPERTENSION, BENIGN: ICD-10-CM

## 2022-07-11 DIAGNOSIS — E66.9 OBESITY, UNSPECIFIED CLASSIFICATION, UNSPECIFIED OBESITY TYPE, UNSPECIFIED WHETHER SERIOUS COMORBIDITY PRESENT: ICD-10-CM

## 2022-07-11 DIAGNOSIS — B35.1 ONYCHOMYCOSIS: ICD-10-CM

## 2022-07-11 DIAGNOSIS — N20.0 KIDNEY STONE: ICD-10-CM

## 2022-07-11 DIAGNOSIS — Z12.5 SCREENING FOR PROSTATE CANCER: ICD-10-CM

## 2022-07-11 LAB
ALBUMIN SERPL BCG-MCNC: 4.7 G/DL (ref 3.5–5.2)
ALP SERPL-CCNC: 40 U/L (ref 40–129)
ALT SERPL W P-5'-P-CCNC: 25 U/L (ref 10–50)
ANION GAP SERPL CALCULATED.3IONS-SCNC: 10 MMOL/L (ref 7–15)
AST SERPL W P-5'-P-CCNC: 27 U/L (ref 10–50)
BILIRUB SERPL-MCNC: 0.2 MG/DL
BUN SERPL-MCNC: 13.2 MG/DL (ref 8–23)
CALCIUM SERPL-MCNC: 9.5 MG/DL (ref 8.8–10.2)
CHLORIDE SERPL-SCNC: 103 MMOL/L (ref 98–107)
CHOLEST SERPL-MCNC: 245 MG/DL
CREAT SERPL-MCNC: 1.03 MG/DL (ref 0.67–1.17)
DEPRECATED HCO3 PLAS-SCNC: 25 MMOL/L (ref 22–29)
ERYTHROCYTE [DISTWIDTH] IN BLOOD BY AUTOMATED COUNT: 13.1 % (ref 10–15)
GFR SERPL CREATININE-BSD FRML MDRD: 82 ML/MIN/1.73M2
GLUCOSE SERPL-MCNC: 110 MG/DL (ref 70–99)
HCT VFR BLD AUTO: 47 % (ref 40–53)
HDLC SERPL-MCNC: 43 MG/DL
HGB BLD-MCNC: 15.8 G/DL (ref 13.3–17.7)
LDLC SERPL CALC-MCNC: ABNORMAL MG/DL
LDLC SERPL DIRECT ASSAY-MCNC: 116 MG/DL
MCH RBC QN AUTO: 29.7 PG (ref 26.5–33)
MCHC RBC AUTO-ENTMCNC: 33.6 G/DL (ref 31.5–36.5)
MCV RBC AUTO: 88 FL (ref 78–100)
NONHDLC SERPL-MCNC: 202 MG/DL
PLATELET # BLD AUTO: 215 10E3/UL (ref 150–450)
POTASSIUM SERPL-SCNC: 4.4 MMOL/L (ref 3.4–5.3)
PROT SERPL-MCNC: 7.8 G/DL (ref 6.4–8.3)
PSA SERPL-MCNC: 1.07 NG/ML (ref 0–4.5)
RBC # BLD AUTO: 5.32 10E6/UL (ref 4.4–5.9)
SODIUM SERPL-SCNC: 138 MMOL/L (ref 136–145)
TRIGL SERPL-MCNC: 672 MG/DL
WBC # BLD AUTO: 7.5 10E3/UL (ref 4–11)

## 2022-07-11 PROCEDURE — 85027 COMPLETE CBC AUTOMATED: CPT | Performed by: PHYSICIAN ASSISTANT

## 2022-07-11 PROCEDURE — 36415 COLL VENOUS BLD VENIPUNCTURE: CPT | Performed by: PHYSICIAN ASSISTANT

## 2022-07-11 PROCEDURE — 80061 LIPID PANEL: CPT | Performed by: PHYSICIAN ASSISTANT

## 2022-07-11 PROCEDURE — 99396 PREV VISIT EST AGE 40-64: CPT | Performed by: PHYSICIAN ASSISTANT

## 2022-07-11 PROCEDURE — G0103 PSA SCREENING: HCPCS | Performed by: PHYSICIAN ASSISTANT

## 2022-07-11 PROCEDURE — 83721 ASSAY OF BLOOD LIPOPROTEIN: CPT | Mod: 59 | Performed by: PHYSICIAN ASSISTANT

## 2022-07-11 PROCEDURE — 80053 COMPREHEN METABOLIC PANEL: CPT | Performed by: PHYSICIAN ASSISTANT

## 2022-07-11 RX ORDER — LOSARTAN POTASSIUM 100 MG/1
100 TABLET ORAL DAILY
Qty: 90 TABLET | Refills: 3 | Status: SHIPPED | OUTPATIENT
Start: 2022-07-11 | End: 2023-07-31

## 2022-07-11 RX ORDER — CICLOPIROX 80 MG/ML
SOLUTION TOPICAL
Qty: 6.6 ML | Refills: 1 | Status: SHIPPED | OUTPATIENT
Start: 2022-07-11 | End: 2024-04-08

## 2022-07-11 RX ORDER — TADALAFIL 20 MG/1
20 TABLET ORAL DAILY PRN
Qty: 30 TABLET | Refills: 3 | Status: SHIPPED | OUTPATIENT
Start: 2022-07-11 | End: 2022-07-11

## 2022-07-11 RX ORDER — TADALAFIL 20 MG/1
20 TABLET ORAL DAILY PRN
Qty: 30 TABLET | Refills: 3 | Status: SHIPPED | OUTPATIENT
Start: 2022-07-11 | End: 2023-07-31

## 2022-07-11 ASSESSMENT — PAIN SCALES - GENERAL: PAINLEVEL: NO PAIN (0)

## 2022-07-11 NOTE — TELEPHONE ENCOUNTER
Pending Prescriptions:                       Disp   Refills    tadalafil (CIALIS) 20 MG tablet           30 tab*3            Sig: Take 1 tablet (20 mg) by mouth daily as needed           (sexual activity)    Pt requested a different pharmacy for this rx. Canceled at previous pharmacy, new pharmacy pended.

## 2022-07-11 NOTE — PROGRESS NOTES
SUBJECTIVE:   CC: Tim Rutherford is an 62 year old male who presents for preventative health visit.     Patient has been advised of split billing requirements and indicates understanding: Yes  Jorge Alberto is a pleasant 62-year-old male who presents to the clinic today for a preventative visit.  He has a past medical history of hypertension, hyperlipidemia, erectile dysfunction, and kidney stones.  He has no questions or concerns in regards to his chronic health.  He is not a smoker.  Social alcohol use.  No concerns about anxiety or depression.    Also been dealing with a toenail infection for some time.  He is wondering if there is anything we can do to help treat this.    Chief Complaint   Patient presents with     [facility]     Formerly Dr. Rousseau. Will need prescriptions soon but none needed today. Wondering if it is worth taking prevagen.      Toenail     Right big toe has had a fungus for years and OTC fungal treatments. None have worked. Wondering if there is something he can be prescribed to help fungal.          Today's PHQ-2 Score:   PHQ-2 ( 1999 Pfizer) 7/11/2022   Q1: Little interest or pleasure in doing things 0   Q2: Feeling down, depressed or hopeless 0   PHQ-2 Score 0   PHQ-2 Total Score (12-17 Years)- Positive if 3 or more points; Administer PHQ-A if positive -   Q1: Little interest or pleasure in doing things Not at all   Q2: Feeling down, depressed or hopeless Not at all   PHQ-2 Score 0       Abuse: Current or Past(Physical, Sexual or Emotional)- No  Do you feel safe in your environment? Yes    Have you ever done Advance Care Planning? (For example, a Health Directive, POLST, or a discussion with a medical provider or your loved ones about your wishes): No, advance care planning information given to patient to review.  Patient declined advance care planning discussion at this time.    Social History     Tobacco Use     Smoking status: Never Smoker     Smokeless tobacco: Never Used   Substance Use  "Topics     Alcohol use: Not on file     Comment: Alcoholic Drinks/day: less than 1 beer/week         No flowsheet data found.    Last PSA:   Prostate Specific Antigen Screen   Date Value Ref Range Status   07/11/2022 1.07 0.00 - 4.50 ng/mL Final   03/20/2017 0.9 0.0 - 3.5 ng/mL Final           Reviewed and updated as needed this visit by clinical staff   Tobacco  Allergies  Meds                Reviewed and updated as needed this visit by Provider                   Past Medical History:   Diagnosis Date     Essential hypertension, malignant      Essential hypertension, malignant      Hypercholesterolemia      Hypercholesterolemia      Nasal septal deviation      Nasal septal deviation       No past surgical history on file.  OB History   No obstetric history on file.       Review of Systems   Constitutional: Negative for activity change, chills, diaphoresis, fatigue and fever.   HENT: Negative for congestion, postnasal drip, rhinorrhea, sinus pressure and sore throat.    Eyes: Negative for pain, discharge, redness and itching.   Respiratory: Negative for cough, shortness of breath and wheezing.    Cardiovascular: Negative for chest pain and palpitations.   Gastrointestinal: Negative for abdominal pain, diarrhea, heartburn, nausea and vomiting.   Genitourinary: Negative for penile discharge, penile pain, penile swelling, scrotal swelling and testicular pain.   Skin: Negative for rash.        Fungal infection under right great toenail   Neurological: Negative for dizziness, light-headedness, numbness and headaches.       OBJECTIVE:   BP (!) 152/94 (BP Location: Left arm, Patient Position: Sitting, Cuff Size: Adult Regular)   Pulse 77   Resp 14   Ht 1.88 m (6' 2\")   Wt 118.6 kg (261 lb 6.4 oz)   SpO2 97%   BMI 33.56 kg/m      Physical Exam  Constitutional:       General: He is not in acute distress.     Appearance: He is well-developed.   HENT:      Right Ear: Tympanic membrane and external ear normal.      " Left Ear: Tympanic membrane and external ear normal.      Nose: Nose normal.      Mouth/Throat:      Mouth: No oral lesions.      Pharynx: No oropharyngeal exudate.   Eyes:      General:         Right eye: No discharge.         Left eye: No discharge.      Conjunctiva/sclera: Conjunctivae normal.      Pupils: Pupils are equal, round, and reactive to light.   Neck:      Thyroid: No thyromegaly.      Trachea: No tracheal deviation.   Cardiovascular:      Rate and Rhythm: Normal rate and regular rhythm.      Pulses: Normal pulses.      Heart sounds: Normal heart sounds, S1 normal and S2 normal. No murmur heard.    No S3 or S4 sounds.   Pulmonary:      Effort: Pulmonary effort is normal. No respiratory distress.      Breath sounds: Normal breath sounds. No wheezing or rales.   Abdominal:      General: Bowel sounds are normal.      Palpations: Abdomen is soft. There is no mass.      Tenderness: There is no abdominal tenderness.   Musculoskeletal:         General: No deformity. Normal range of motion.      Cervical back: Neck supple.   Lymphadenopathy:      Cervical: No cervical adenopathy.   Skin:     General: Skin is warm and dry.      Findings: No lesion or rash.   Neurological:      Mental Status: He is alert and oriented to person, place, and time.      Motor: No abnormal muscle tone.      Deep Tendon Reflexes: Reflexes are normal and symmetric.   Psychiatric:         Speech: Speech normal.         Thought Content: Thought content normal.         Judgment: Judgment normal.           ASSESSMENT/PLAN:       ICD-10-CM    1. Annual physical exam  Z00.00    2. Essential hypertension, benign  I10 losartan (COZAAR) 100 MG tablet   3. Essential Hypercholesterolemia  E78.00 Lipid panel reflex to direct LDL Fasting     Lipid panel reflex to direct LDL Fasting     LDL cholesterol direct     Lipid panel reflex to direct LDL Fasting   4. Obesity, unspecified classification, unspecified obesity type, unspecified whether serious  comorbidity present  E66.9    5. Benign neoplasm of descending colon  D12.4    6. Kidney stone  N20.0    7. Erectile dysfunction, unspecified erectile dysfunction type  N52.9 DISCONTINUED: tadalafil (CIALIS) 20 MG tablet   8. Screening for prostate cancer  Z12.5 CBC with platelets     Comprehensive metabolic panel (BMP + Alb, Alk Phos, ALT, AST, Total. Bili, TP)     PSA, screen     CBC with platelets     Comprehensive metabolic panel (BMP + Alb, Alk Phos, ALT, AST, Total. Bili, TP)     PSA, screen   9. Onychomycosis  B35.1 ciclopirox (PENLAC) 8 % external solution     #1 annual physical-Jorge Alberto is a pleasant 62-year-old male who presents to the clinic today to establish care and an annual physical.  He has a past medical history of hypertension, hyperlipidemia, obesity, erectile dysfunction, and kidney stones.  He has no concerns in regards to his chronic health today.  We will update labs including a CBC, complete metabolic panel, PSA, and a lipid.    #2 hypertension-blood pressure is mildly elevated at 152/94 today.  We will have him continue to monitor this.  He is currently on losartan 100 mg daily.    #3 hyperlipidemia-he is due for labs today.  His last LDL was elevated at 151.  We will check a lipid panel today.  His triglycerides were quite elevated so we will recheck this fasting.  Diet and exercise discussed    #4 erectile dysfunction-currently uses Viagra and is doing well.  No side effects of the medication    # 5 history of kidney stones-he did have kidney stones about 20 years ago.  No concerns since    #6 onychomycosis right great toenail-she does have fungal infection to his right great toenail. He has tired OTC meds that have not helped.  We will prescribe Penlac.    #7 colon cancer screening- up-to-date on colonoscopy.  No colonoscopy is in 2026.    #8 prostate cancer screening-we will check a PSA level    # 9 obesity-BMI 33 diet and exercise discussed     Follow-up Visit   Expected date:  Jul 13,  "2023 (Approximate)      Follow Up Appointment Details:     Follow-up with whom?: Me    Follow-Up for what?: Adult Preventive    Any Additional Chronic Condition Management?: General (Other)    How?: In Person                    Patient has been advised of split billing requirements and indicates understanding: Yes    COUNSELING:   Reviewed preventive health counseling, as reflected in patient instructions       Regular exercise       Healthy diet/nutrition       Vision screening       Alcohol Use        Colorectal cancer screening       Prostate cancer screening    Estimated body mass index is 33.56 kg/m  as calculated from the following:    Height as of this encounter: 1.88 m (6' 2\").    Weight as of this encounter: 118.6 kg (261 lb 6.4 oz).     Weight management plan: Discussed healthy diet and exercise guidelines    He reports that he has never smoked. He has never used smokeless tobacco.      Counseling Resources:  ATP IV Guidelines  Pooled Cohorts Equation Calculator  FRAX Risk Assessment  ICSI Preventive Guidelines  Dietary Guidelines for Americans, 2010  USDA's MyPlate  ASA Prophylaxis  Lung CA Screening    Tolu Coyne PA-C  [facility]  "

## 2022-07-11 NOTE — LETTER
July 12, 2022      Tim Rutherford  [address] [address]        Dear ,    We are writing to inform you of your test results.  Cholesterol is better LDL is 116 but Trig are high at 672. I would like to recheck this fasting. Sometime his can be high when not fasting and would like to see how it looks fasting. Labs placed.   PSA was WNL.   CBC stable. '   CMP was stable as well.       Resulted Orders   CBC with platelets   Result Value Ref Range    WBC Count 7.5 4.0 - 11.0 10e3/uL    RBC Count 5.32 4.40 - 5.90 10e6/uL    Hemoglobin 15.8 13.3 - 17.7 g/dL    Hematocrit 47.0 40.0 - 53.0 %    MCV 88 78 - 100 fL    MCH 29.7 26.5 - 33.0 pg    MCHC 33.6 31.5 - 36.5 g/dL    RDW 13.1 10.0 - 15.0 %    Platelet Count 215 150 - 450 10e3/uL   Comprehensive metabolic panel (BMP + Alb, Alk Phos, ALT, AST, Total. Bili, TP)   Result Value Ref Range    Sodium 138 136 - 145 mmol/L    Potassium 4.4 3.4 - 5.3 mmol/L    Creatinine 1.03 0.67 - 1.17 mg/dL    Urea Nitrogen 13.2 8.0 - 23.0 mg/dL    Chloride 103 98 - 107 mmol/L    Carbon Dioxide (CO2) 25 22 - 29 mmol/L    Anion Gap 10 7 - 15 mmol/L    Glucose 110 (H) 70 - 99 mg/dL    Calcium 9.5 8.8 - 10.2 mg/dL    Protein Total 7.8 6.4 - 8.3 g/dL    Albumin 4.7 3.5 - 5.2 g/dL    Bilirubin Total 0.2 <=1.2 mg/dL    Alkaline Phosphatase 40 40 - 129 U/L    AST 27 10 - 50 U/L    ALT 25 10 - 50 U/L    GFR Estimate 82 >60 mL/min/1.73m2      Comment:      Effective December 21, 2021 eGFRcr in adults is calculated using the 2021 CKD-EPI creatinine equation which includes age and gender (Abi kelly al., NEJ, DOI: 10.1056/QYMXpc4753102)   Lipid panel reflex to direct LDL Fasting   Result Value Ref Range    Cholesterol 245 (H) <200 mg/dL    Triglycerides 672 (H) <150 mg/dL    Direct Measure HDL 43 >=40 mg/dL    LDL Cholesterol Calculated        Comment:      Cannot estimate LDL when triglyceride exceeds 400 mg/dL    Non HDL Cholesterol 202 (H) <130 mg/dL    Narrative     Cholesterol  Desirable:  <200 mg/dL    Triglycerides  Normal:  Less than 150 mg/dL  Borderline High:  150-199 mg/dL  High:  200-499 mg/dL  Very High:  Greater than or equal to 500 mg/dL    Direct Measure HDL  Female:  Greater than or equal to 50 mg/dL   Male:  Greater than or equal to 40 mg/dL    LDL Cholesterol  Desirable:  <100mg/dL  Above Desirable:  100-129 mg/dL   Borderline High:  130-159 mg/dL   High:  160-189 mg/dL   Very High:  >= 190 mg/dL    Non HDL Cholesterol  Desirable:  130 mg/dL  Above Desirable:  130-159 mg/dL  Borderline High:  160-189 mg/dL  High:  190-219 mg/dL  Very High:  Greater than or equal to 220 mg/dL   PSA, screen   Result Value Ref Range    Prostate Specific Antigen Screen 1.07 0.00 - 4.50 ng/mL    Narrative    This result is obtained using the Roche Elecsys total PSA method on the oscar e801 immunoassay analyzer. Results obtained with different assay methods or kits cannot be used interchangeably.   LDL cholesterol direct   Result Value Ref Range    LDL Cholesterol Direct 116 (H) <100 mg/dL      Comment:      Age 2-19 years:  Desirable: 0-110 mg/dL   Borderline high: 110-129 mg/dL   High: >= 130 mg/dL    Age 20 years and older:  Desirable: <100mg/dL  Above desirable: 100-129 mg/dL   Borderline high: 130-159 mg/dL   High: 160-189 mg/dL   Very high: >= 190 mg/dL       If you have any questions or concerns, please call the clinic at the number listed above.       Sincerely,      Tolu Coyne PA-C

## 2022-07-13 ASSESSMENT — ENCOUNTER SYMPTOMS
EYE REDNESS: 0
FATIGUE: 0
DIARRHEA: 0
RHINORRHEA: 0
HEADACHES: 0
WHEEZING: 0
NAUSEA: 0
ACTIVITY CHANGE: 0
CHILLS: 0
ABDOMINAL PAIN: 0
FEVER: 0
DIZZINESS: 0
HEARTBURN: 0
PALPITATIONS: 0
VOMITING: 0
EYE ITCHING: 0
SHORTNESS OF BREATH: 0
LIGHT-HEADEDNESS: 0
SORE THROAT: 0
NUMBNESS: 0
EYE DISCHARGE: 0
COUGH: 0
SINUS PRESSURE: 0
DIAPHORESIS: 0
EYE PAIN: 0

## 2022-07-28 DIAGNOSIS — I10 ESSENTIAL HYPERTENSION, BENIGN: ICD-10-CM

## 2022-07-28 RX ORDER — LOSARTAN POTASSIUM 100 MG/1
TABLET ORAL
Qty: 90 TABLET | Refills: 3 | OUTPATIENT
Start: 2022-07-28

## 2023-07-31 ENCOUNTER — OFFICE VISIT (OUTPATIENT)
Dept: FAMILY MEDICINE | Facility: CLINIC | Age: 63
End: 2023-07-31
Payer: COMMERCIAL

## 2023-07-31 VITALS
RESPIRATION RATE: 12 BRPM | BODY MASS INDEX: 32.98 KG/M2 | WEIGHT: 257 LBS | HEART RATE: 61 BPM | HEIGHT: 74 IN | OXYGEN SATURATION: 98 % | SYSTOLIC BLOOD PRESSURE: 140 MMHG | TEMPERATURE: 98.4 F | DIASTOLIC BLOOD PRESSURE: 90 MMHG

## 2023-07-31 DIAGNOSIS — N52.9 ERECTILE DYSFUNCTION, UNSPECIFIED ERECTILE DYSFUNCTION TYPE: ICD-10-CM

## 2023-07-31 DIAGNOSIS — I10 ESSENTIAL HYPERTENSION, BENIGN: ICD-10-CM

## 2023-07-31 DIAGNOSIS — Z00.00 ANNUAL PHYSICAL EXAM: Primary | ICD-10-CM

## 2023-07-31 DIAGNOSIS — Z12.5 SCREENING FOR PROSTATE CANCER: ICD-10-CM

## 2023-07-31 DIAGNOSIS — K42.9 UMBILICAL HERNIA WITHOUT OBSTRUCTION AND WITHOUT GANGRENE: ICD-10-CM

## 2023-07-31 DIAGNOSIS — E78.00 PURE HYPERCHOLESTEROLEMIA: ICD-10-CM

## 2023-07-31 LAB
ALBUMIN SERPL BCG-MCNC: 4.9 G/DL (ref 3.5–5.2)
ALP SERPL-CCNC: 39 U/L (ref 40–129)
ALT SERPL W P-5'-P-CCNC: 20 U/L (ref 0–70)
ANION GAP SERPL CALCULATED.3IONS-SCNC: 12 MMOL/L (ref 7–15)
AST SERPL W P-5'-P-CCNC: 25 U/L (ref 0–45)
BILIRUB SERPL-MCNC: 0.5 MG/DL
BUN SERPL-MCNC: 16.6 MG/DL (ref 8–23)
CALCIUM SERPL-MCNC: 9.8 MG/DL (ref 8.8–10.2)
CHLORIDE SERPL-SCNC: 104 MMOL/L (ref 98–107)
CHOLEST SERPL-MCNC: 220 MG/DL
CREAT SERPL-MCNC: 1.11 MG/DL (ref 0.67–1.17)
DEPRECATED HCO3 PLAS-SCNC: 23 MMOL/L (ref 22–29)
ERYTHROCYTE [DISTWIDTH] IN BLOOD BY AUTOMATED COUNT: 13.2 % (ref 10–15)
GFR SERPL CREATININE-BSD FRML MDRD: 75 ML/MIN/1.73M2
GLUCOSE SERPL-MCNC: 106 MG/DL (ref 70–99)
HBA1C MFR BLD: 6 % (ref 0–5.6)
HCT VFR BLD AUTO: 46.1 % (ref 40–53)
HDLC SERPL-MCNC: 50 MG/DL
HGB BLD-MCNC: 15.6 G/DL (ref 13.3–17.7)
LDLC SERPL CALC-MCNC: 135 MG/DL
MCH RBC QN AUTO: 30.5 PG (ref 26.5–33)
MCHC RBC AUTO-ENTMCNC: 33.8 G/DL (ref 31.5–36.5)
MCV RBC AUTO: 90 FL (ref 78–100)
NONHDLC SERPL-MCNC: 170 MG/DL
PLATELET # BLD AUTO: 256 10E3/UL (ref 150–450)
POTASSIUM SERPL-SCNC: 4.4 MMOL/L (ref 3.4–5.3)
PROT SERPL-MCNC: 7.8 G/DL (ref 6.4–8.3)
PSA SERPL DL<=0.01 NG/ML-MCNC: 1.03 NG/ML (ref 0–4.5)
RBC # BLD AUTO: 5.11 10E6/UL (ref 4.4–5.9)
SODIUM SERPL-SCNC: 139 MMOL/L (ref 136–145)
TRIGL SERPL-MCNC: 174 MG/DL
WBC # BLD AUTO: 7.5 10E3/UL (ref 4–11)

## 2023-07-31 PROCEDURE — 80061 LIPID PANEL: CPT | Performed by: PHYSICIAN ASSISTANT

## 2023-07-31 PROCEDURE — 85027 COMPLETE CBC AUTOMATED: CPT | Performed by: PHYSICIAN ASSISTANT

## 2023-07-31 PROCEDURE — G0103 PSA SCREENING: HCPCS | Performed by: PHYSICIAN ASSISTANT

## 2023-07-31 PROCEDURE — 99214 OFFICE O/P EST MOD 30 MIN: CPT | Mod: 25 | Performed by: PHYSICIAN ASSISTANT

## 2023-07-31 PROCEDURE — 99396 PREV VISIT EST AGE 40-64: CPT | Performed by: PHYSICIAN ASSISTANT

## 2023-07-31 PROCEDURE — 83036 HEMOGLOBIN GLYCOSYLATED A1C: CPT | Performed by: PHYSICIAN ASSISTANT

## 2023-07-31 PROCEDURE — 36415 COLL VENOUS BLD VENIPUNCTURE: CPT | Performed by: PHYSICIAN ASSISTANT

## 2023-07-31 PROCEDURE — 80053 COMPREHEN METABOLIC PANEL: CPT | Performed by: PHYSICIAN ASSISTANT

## 2023-07-31 RX ORDER — LOSARTAN POTASSIUM 100 MG/1
100 TABLET ORAL DAILY
Qty: 90 TABLET | Refills: 3 | Status: SHIPPED | OUTPATIENT
Start: 2023-07-31 | End: 2024-08-27

## 2023-07-31 RX ORDER — TADALAFIL 20 MG/1
20 TABLET ORAL DAILY PRN
Qty: 30 TABLET | Refills: 4 | Status: SHIPPED | OUTPATIENT
Start: 2023-07-31

## 2023-07-31 ASSESSMENT — ENCOUNTER SYMPTOMS
JOINT SWELLING: 0
HEADACHES: 0
ARTHRALGIAS: 0
EYE PAIN: 0
SORE THROAT: 0
HEMATURIA: 0
HEARTBURN: 0
PARESTHESIAS: 0
MYALGIAS: 0
SHORTNESS OF BREATH: 0
DIZZINESS: 0
HEMATOCHEZIA: 0
DYSURIA: 0
FEVER: 0
WEAKNESS: 0
NERVOUS/ANXIOUS: 0
DIARRHEA: 0
FREQUENCY: 0
COUGH: 0
CHILLS: 0
CONSTIPATION: 0
PALPITATIONS: 0
ABDOMINAL PAIN: 0
NAUSEA: 0

## 2023-07-31 NOTE — LETTER
August 1, 2023      Tim Rutherford  [address] [address]        Dear ,    We are writing to inform you of your test results.    Complete Blood Count: White blood cell, red blood cell, platelets, and hemoglobin are stable.   Metabolic panel : Kidney function, liver function, electrolytes are stable.    Lipids: Lipid panel is stable but elevated. Work on diet and exercise to help improve this. I would recommend starting on a medication to help lower this. Please let me know if you are willing to do this.   PSA: Your prostate screening lab was within normal limit   A1c is a little high. Watch your carbs and increases activity to help with this.     Resulted Orders   CBC with platelets   Result Value Ref Range    WBC Count 7.5 4.0 - 11.0 10e3/uL    RBC Count 5.11 4.40 - 5.90 10e6/uL    Hemoglobin 15.6 13.3 - 17.7 g/dL    Hematocrit 46.1 40.0 - 53.0 %    MCV 90 78 - 100 fL    MCH 30.5 26.5 - 33.0 pg    MCHC 33.8 31.5 - 36.5 g/dL    RDW 13.2 10.0 - 15.0 %    Platelet Count 256 150 - 450 10e3/uL   Comprehensive metabolic panel (BMP + Alb, Alk Phos, ALT, AST, Total. Bili, TP)   Result Value Ref Range    Sodium 139 136 - 145 mmol/L    Potassium 4.4 3.4 - 5.3 mmol/L    Chloride 104 98 - 107 mmol/L    Carbon Dioxide (CO2) 23 22 - 29 mmol/L    Anion Gap 12 7 - 15 mmol/L    Urea Nitrogen 16.6 8.0 - 23.0 mg/dL    Creatinine 1.11 0.67 - 1.17 mg/dL    Calcium 9.8 8.8 - 10.2 mg/dL    Glucose 106 (H) 70 - 99 mg/dL    Alkaline Phosphatase 39 (L) 40 - 129 U/L    AST 25 0 - 45 U/L      Comment:      Reference intervals for this test were updated on 6/12/2023 to more accurately reflect our healthy population. There may be differences in the flagging of prior results with similar values performed with this method. Interpretation of those prior results can be made in the context of the updated reference intervals.    ALT 20 0 - 70 U/L      Comment:      Reference intervals for this test were updated on 6/12/2023  to more accurately reflect our healthy population. There may be differences in the flagging of prior results with similar values performed with this method. Interpretation of those prior results can be made in the context of the updated reference intervals.      Protein Total 7.8 6.4 - 8.3 g/dL    Albumin 4.9 3.5 - 5.2 g/dL    Bilirubin Total 0.5 <=1.2 mg/dL    GFR Estimate 75 >60 mL/min/1.73m2   Lipid panel reflex to direct LDL Fasting   Result Value Ref Range    Cholesterol 220 (H) <200 mg/dL    Triglycerides 174 (H) <150 mg/dL    Direct Measure HDL 50 >=40 mg/dL    LDL Cholesterol Calculated 135 (H) <=100 mg/dL    Non HDL Cholesterol 170 (H) <130 mg/dL    Narrative    Cholesterol  Desirable:  <200 mg/dL    Triglycerides  Normal:  Less than 150 mg/dL  Borderline High:  150-199 mg/dL  High:  200-499 mg/dL  Very High:  Greater than or equal to 500 mg/dL    Direct Measure HDL  Female:  Greater than or equal to 50 mg/dL   Male:  Greater than or equal to 40 mg/dL    LDL Cholesterol  Desirable:  <100mg/dL  Above Desirable:  100-129 mg/dL   Borderline High:  130-159 mg/dL   High:  160-189 mg/dL   Very High:  >= 190 mg/dL    Non HDL Cholesterol  Desirable:  130 mg/dL  Above Desirable:  130-159 mg/dL  Borderline High:  160-189 mg/dL  High:  190-219 mg/dL  Very High:  Greater than or equal to 220 mg/dL   Hemoglobin A1c   Result Value Ref Range    Hemoglobin A1C 6.0 (H) 0.0 - 5.6 %      Comment:      Normal <5.7%   Prediabetes 5.7-6.4%    Diabetes 6.5% or higher     Note: Adopted from ADA consensus guidelines.   PSA, screen   Result Value Ref Range    Prostate Specific Antigen Screen 1.03 0.00 - 4.50 ng/mL    Narrative    This result is obtained using the Roche Elecsys total PSA method on the oscar e801 immunoassay analyzer. Results obtained with different assay methods or kits cannot be used interchangeably.       If you have any questions or concerns, please call the clinic at the number listed above.        Sincerely,      Tolu Coyne PA-C

## 2023-07-31 NOTE — PROGRESS NOTES
SUBJECTIVE:   CC: Tim is an 63 year old who presents for preventative health visit.       7/31/2023    10:48 AM   Additional Questions   Roomed by Ingrid Basurto   Accompanied by rosana JOINER Tim is a pleasant 68-year-old male who presents to the clinic today for annual physical.  Past medical history significant for hypertension, hyperlipidemia, erectile dysfunction and a history of kidney stones.  No concerns regarding his chronic health today.    He is currently on losartan and Viagra.    Healthy Habits:     Getting at least 3 servings of Calcium per day:  Yes    Bi-annual eye exam:  Yes    Dental care twice a year:  Yes    Sleep apnea or symptoms of sleep apnea:  None    Diet:  Regular (no restrictions)    Frequency of exercise:  4-5 days/week    Duration of exercise:  30-45 minutes    Taking medications regularly:  Yes    Medication side effects:  None    Additional concerns today:  No      Today's PHQ-2 Score:       7/31/2023    10:46 AM   PHQ-2 ( 1999 [employer])   Q1: Little interest or pleasure in doing things 0   Q2: Feeling down, depressed or hopeless 0   PHQ-2 Score 0   Q1: Little interest or pleasure in doing things Not at all   Q2: Feeling down, depressed or hopeless Not at all   PHQ-2 Score 0         Social History     Tobacco Use    Smoking status: Never     Passive exposure: Never    Smokeless tobacco: Never   Substance Use Topics    Alcohol use: Not on file     Comment: Alcoholic Drinks/day: less than 1 beer/week             7/31/2023    10:45 AM   Alcohol Use   Prescreen: >3 drinks/day or >7 drinks/week? No       Last PSA:   Prostate Specific Antigen Screen   Date Value Ref Range Status   07/11/2022 1.07 0.00 - 4.50 ng/mL Final   03/20/2017 0.9 0.0 - 3.5 ng/mL Final       Reviewed orders with patient. Reviewed health maintenance and updated orders accordingly - Yes  Lab work is in process    Reviewed and updated as needed this visit by clinical staff   Tobacco  Allergies  Meds           "    Reviewed and updated as needed this visit by Provider                 Past Medical History:   Diagnosis Date    Essential hypertension, malignant     Essential hypertension, malignant     Hypercholesterolemia     Hypercholesterolemia     Nasal septal deviation     Nasal septal deviation       No past surgical history on file.    Review of Systems   Constitutional:  Negative for chills and fever.   HENT:  Negative for congestion, ear pain, hearing loss and sore throat.    Eyes:  Negative for pain and visual disturbance.   Respiratory:  Negative for cough and shortness of breath.    Cardiovascular:  Negative for chest pain, palpitations and peripheral edema.   Gastrointestinal:  Negative for abdominal pain, constipation, diarrhea, heartburn, hematochezia and nausea.   Genitourinary:  Positive for impotence. Negative for dysuria, frequency, genital sores, hematuria and urgency.   Musculoskeletal:  Negative for arthralgias, joint swelling and myalgias.   Skin:  Positive for rash.   Neurological:  Negative for dizziness, weakness, headaches and paresthesias.   Psychiatric/Behavioral:  Negative for mood changes. The patient is not nervous/anxious.          OBJECTIVE:   BP (!) 140/90   Pulse 61   Temp 98.4  F (36.9  C) (Oral)   Resp 12   Ht 1.88 m (6' 2\")   Wt 116.6 kg (257 lb)   SpO2 98%   BMI 33.00 kg/m      Physical Exam  Vitals and nursing note reviewed.   Constitutional:       General: He is not in acute distress.     Appearance: Normal appearance. He is well-developed and well-groomed. He is not ill-appearing or toxic-appearing.   HENT:      Head: Normocephalic and atraumatic.      Right Ear: Tympanic membrane, ear canal and external ear normal.      Left Ear: Tympanic membrane, ear canal and external ear normal.      Mouth/Throat:      Lips: Pink.      Mouth: Mucous membranes are moist.      Palate: No mass.      Pharynx: Oropharynx is clear. Uvula midline.      Tonsils: No tonsillar exudate or tonsillar " abscesses.   Eyes:      General: Lids are normal.         Right eye: No discharge.         Left eye: No discharge.   Neck:      Trachea: Trachea normal.   Cardiovascular:      Rate and Rhythm: Normal rate and regular rhythm.      Heart sounds: S1 normal and S2 normal. No murmur heard.  Pulmonary:      Effort: Pulmonary effort is normal.      Breath sounds: Normal breath sounds and air entry.   Abdominal:      General: Bowel sounds are normal. There is no distension.      Palpations: Abdomen is soft.      Tenderness: There is no abdominal tenderness. There is no guarding or rebound.      Hernia: A hernia is present.   Musculoskeletal:      Cervical back: Full passive range of motion without pain and neck supple.      Right lower leg: No edema.      Left lower leg: No edema.   Lymphadenopathy:      Cervical: No cervical adenopathy.   Skin:     General: Skin is warm and dry.      Findings: No lesion or rash.   Neurological:      General: No focal deficit present.      Mental Status: He is alert.      Cranial Nerves: No cranial nerve deficit.      Gait: Gait is intact.      Deep Tendon Reflexes:      Reflex Scores:       Patellar reflexes are 2+ on the right side and 2+ on the left side.  Psychiatric:         Attention and Perception: Attention normal.         Mood and Affect: Mood normal.         Speech: Speech normal.         ASSESSMENT/PLAN:     Annual physical exam  Update screening labs including CBC, complete metabolic panel, lipid, A1c, and PSA.  - CBC with platelets; Future  - Comprehensive metabolic panel (BMP + Alb, Alk Phos, ALT, AST, Total. Bili, TP); Future  - Lipid panel reflex to direct LDL Fasting; Future  - Hemoglobin A1c; Future  - PSA, screen; Future  - CBC with platelets  - Comprehensive metabolic panel (BMP + Alb, Alk Phos, ALT, AST, Total. Bili, TP)  - Lipid panel reflex to direct LDL Fasting  - Hemoglobin A1c  - PSA, screen    Essential Hypercholesterolemia  He is fasting today and we will recheck  a lipid panel.  Last lipid panel as of 7/20/2022 showed an LDL of 202, triglyceride of 672, HDL 43.  He is to continue to watch his diet and stay active.  He was told to recheck his cholesterol fasting July 2022 but this was not done.  We will see where his cholesterol level is today as he is fasting.  - Lipid panel reflex to direct LDL Fasting; Future  - Lipid panel reflex to direct LDL Fasting    Erectile dysfunction, unspecified erectile dysfunction type  Tinea with Cialis.  No side effects on the medication  - tadalafil (CIALIS) 20 MG tablet; Take 1 tablet (20 mg) by mouth daily as needed (sexual activity)    Screening for prostate cancer  - PSA, screen; Future  - PSA, screen    Essential hypertension, benign  Blood pressure is 140/90.  Borderline today.  He is currently on losartan 100 mg daily.  He would like to continue on his current medications work on his diet and activity.  I recommended continuing to monitor blood pressure.  If his blood pressure rises greater than 140/90 he is to follow-up so we can adjust his medications.  He is agreeable to this  - losartan (COZAAR) 100 MG tablet; Take 1 tablet (100 mg) by mouth daily    Umbilical hernia without obstruction and without gangrene  He has had an umbilical hernia for some time.  Nonpainful.  Reducible.  - Adult General Surg Referral; Future    Colon cancer screening  Up-to-date on colonoscopy.  Next colonoscopy due 7/26       Follow-up Visit   Expected date:  Jul 31, 2024 (Approximate)      Follow Up Appointment Details:     Follow-up with whom?: Me    Follow-Up for what?: Adult Preventive    How?: In Person                     Current Outpatient Medications   Medication    ciclopirox (PENLAC) 8 % external solution    losartan (COZAAR) 100 MG tablet    Multiple Vitamins-Minerals (CENTRUM SILVER ADULT 50+) TABS    tadalafil (CIALIS) 20 MG tablet     No current facility-administered medications for this visit.           Patient has been advised of split  "billing requirements and indicates understanding: Yes      COUNSELING:   Reviewed preventive health counseling, as reflected in patient instructions       Regular exercise       Healthy diet/nutrition       Vision screening       Colorectal cancer screening       Prostate cancer screening      BMI:   Estimated body mass index is 33 kg/m  as calculated from the following:    Height as of this encounter: 1.88 m (6' 2\").    Weight as of this encounter: 116.6 kg (257 lb).   Weight management plan: Discussed healthy diet and exercise guidelines      He reports that he has never smoked. He has never been exposed to tobacco smoke. He has never used smokeless tobacco.            Tolu Coyne PA-C  [facility]  "

## 2023-08-02 ENCOUNTER — OFFICE VISIT (OUTPATIENT)
Dept: SURGERY | Facility: CLINIC | Age: 63
End: 2023-08-02
Attending: PHYSICIAN ASSISTANT
Payer: COMMERCIAL

## 2023-08-02 ENCOUNTER — TELEPHONE (OUTPATIENT)
Dept: SURGERY | Facility: CLINIC | Age: 63
End: 2023-08-02

## 2023-08-02 VITALS — WEIGHT: 257 LBS | BODY MASS INDEX: 32.98 KG/M2 | HEIGHT: 74 IN

## 2023-08-02 DIAGNOSIS — K42.9 UMBILICAL HERNIA WITHOUT OBSTRUCTION AND WITHOUT GANGRENE: ICD-10-CM

## 2023-08-02 PROCEDURE — 99203 OFFICE O/P NEW LOW 30 MIN: CPT | Performed by: SURGERY

## 2023-08-02 NOTE — LETTER
8/2/2023         RE: Tmi Rutherford  [address] [address]        Dear Colleague,    Thank you for referring your patient, Tim Rutherford, to the [facility] [place]. Please see a copy of my visit note below.    HPI:  Tim Rutherford is a 63 year old male who was referred to me by Tolu Coyne for an umbilical hernia.  He presents with complaints of a bulge at the umbilical region with minimal dicomfort.   He has noted this for the past several years.  He denies any nausea, vomiting, fevers, chills, bloody bowel movements or any other complaints at this time.       Allergies:Patient has no known allergies.    Past Medical History:   Diagnosis Date     Essential hypertension, malignant      Essential hypertension, malignant      Hypercholesterolemia      Hypercholesterolemia      Nasal septal deviation      Nasal septal deviation        No past surgical history on file.    CURRENT MEDS:  Current Outpatient Medications   Medication Sig Dispense Refill     ciclopirox (PENLAC) 8 % external solution Apply to adjacent skin and affected nails daily.  Remove with alcohol every 7 days, then repeat. 6.6 mL 1     losartan (COZAAR) 100 MG tablet Take 1 tablet (100 mg) by mouth daily 90 tablet 3     Multiple Vitamins-Minerals (CENTRUM SILVER ADULT 50+) TABS Take 1 tablet by mouth every 24 hours       tadalafil (CIALIS) 20 MG tablet Take 1 tablet (20 mg) by mouth daily as needed (sexual activity) 30 tablet 4       Family History   Problem Relation Age of Onset     Alcoholism Paternal Grandfather      Hyperlipidemia Mother      Glaucoma Mother      No Known Problems Father         bladder cancer     Lymphoma Daughter         hodgkins        reports that he has never smoked. He has never been exposed to tobacco smoke. He has never used smokeless tobacco. He reports that he does not use drugs.    Review of Systems -   The 12 point review of systems  is within  "normal limits except for as mentioned above in the HPI.  General ROS: No complaints or constitutional symptoms  Ophthalmic ROS: No complaints of visual changes  Skin: No complaints or symptoms   Endocrine: No complaints or symptoms  Hematologic/Lymphatic: No symptoms or complaints  Psychiatric: No symptoms or complaints  Respiratory ROS: no cough, shortness of breath, or wheezing  Cardiovascular ROS: no chest pain or dyspnea on exertion  Gastrointestinal ROS: As per HPI  Genito-Urinary ROS: no dysuria, trouble voiding, or hematuria  Musculoskeletal ROS: no joint or muscle pain  Neurological ROS: no TIA or stroke symptoms      Ht 1.88 m (6' 2\")   Wt 116.6 kg (257 lb)   BMI 33.00 kg/m    Body mass index is 33 kg/m .    EXAM:  GENERAL: Well developed male  HEENT: Extra ocular muscles intact, pupils are round and reactive, sclera is anicteric,   NECK:  No obvious masses or deformities  ABDOMEN: Soft, 1 to 2 cm umbilical hernia with chronically incarcerated adipose tissue  NEURO: No obvious defects noted.  EXT: No edema, no obvious deformities or any other abnormalities        Assessment/Plan:    Tim Rutherford is a 63 year old male with an umbilical hernia.  The pathophysiology of umbilical hernias was discussed as were the surgical and non-operative management strategies.      The risks of surgery were discussed which include, but are not limited to, bleeding, infection, recurrent hernia, chronic pain, poor cosmesis, blood clots, stroke, heart attack and death.  Additionally, the risks of observation were discussed which include, but are not limited to, enlargement of the hernia, incarceration, strangulation, pain and death.      He understands everything which was discussed and has consented to proceed with surgery.  We will therefore schedule surgery accordingly based on his schedule.  This will likely be in next spring which is reasonable given the lack of symptoms..      William Paz D.O. FACS  [phone]  [facility] Department of Surgery      Again, thank you for allowing me to participate in the care of your patient.        Sincerely,        Goldy Paz, DO

## 2023-08-02 NOTE — PROGRESS NOTES
HPI:  Tim Rutherford is a 63 year old male who was referred to me by Tolu Coyne for an umbilical hernia.  He presents with complaints of a bulge at the umbilical region with minimal dicomfort.   He has noted this for the past several years.  He denies any nausea, vomiting, fevers, chills, bloody bowel movements or any other complaints at this time.       Allergies:Patient has no known allergies.    Past Medical History:   Diagnosis Date    Essential hypertension, malignant     Essential hypertension, malignant     Hypercholesterolemia     Hypercholesterolemia     Nasal septal deviation     Nasal septal deviation        No past surgical history on file.    CURRENT MEDS:  Current Outpatient Medications   Medication Sig Dispense Refill    ciclopirox (PENLAC) 8 % external solution Apply to adjacent skin and affected nails daily.  Remove with alcohol every 7 days, then repeat. 6.6 mL 1    losartan (COZAAR) 100 MG tablet Take 1 tablet (100 mg) by mouth daily 90 tablet 3    Multiple Vitamins-Minerals (CENTRUM SILVER ADULT 50+) TABS Take 1 tablet by mouth every 24 hours      tadalafil (CIALIS) 20 MG tablet Take 1 tablet (20 mg) by mouth daily as needed (sexual activity) 30 tablet 4       Family History   Problem Relation Age of Onset    Alcoholism Paternal Grandfather     Hyperlipidemia Mother     Glaucoma Mother     No Known Problems Father         bladder cancer    Lymphoma Daughter         hodgkins        reports that he has never smoked. He has never been exposed to tobacco smoke. He has never used smokeless tobacco. He reports that he does not use drugs.    Review of Systems -   The 12 point review of systems  is within normal limits except for as mentioned above in the HPI.  General ROS: No complaints or constitutional symptoms  Ophthalmic ROS: No complaints of visual changes  Skin: No complaints or symptoms   Endocrine: No complaints or symptoms  Hematologic/Lymphatic: No symptoms or complaints  Psychiatric:  "No symptoms or complaints  Respiratory ROS: no cough, shortness of breath, or wheezing  Cardiovascular ROS: no chest pain or dyspnea on exertion  Gastrointestinal ROS: As per HPI  Genito-Urinary ROS: no dysuria, trouble voiding, or hematuria  Musculoskeletal ROS: no joint or muscle pain  Neurological ROS: no TIA or stroke symptoms      Ht 1.88 m (6' 2\")   Wt 116.6 kg (257 lb)   BMI 33.00 kg/m    Body mass index is 33 kg/m .    EXAM:  GENERAL: Well developed male  HEENT: Extra ocular muscles intact, pupils are round and reactive, sclera is anicteric,   NECK:  No obvious masses or deformities  ABDOMEN: Soft, 1 to 2 cm umbilical hernia with chronically incarcerated adipose tissue  NEURO: No obvious defects noted.  EXT: No edema, no obvious deformities or any other abnormalities        Assessment/Plan:    Tim Rutehrford is a 63 year old male with an umbilical hernia.  The pathophysiology of umbilical hernias was discussed as were the surgical and non-operative management strategies.      The risks of surgery were discussed which include, but are not limited to, bleeding, infection, recurrent hernia, chronic pain, poor cosmesis, blood clots, stroke, heart attack and death.  Additionally, the risks of observation were discussed which include, but are not limited to, enlargement of the hernia, incarceration, strangulation, pain and death.      He understands everything which was discussed and has consented to proceed with surgery.  We will therefore schedule surgery accordingly based on his schedule.  This will likely be in next spring which is reasonable given the lack of symptoms..      William Paz D.O., FACS  [phone]  [facility] Department of Surgery  "

## 2023-08-02 NOTE — LETTER
We've received instruction to get you scheduled for surgery with Dr Paz. We have that arranged as follows:     Pre-op Physical:  3/25/2024 at 10:00 am with Tolu Coyne PA-C at the [facility]    Surgery Date: 4/9/2024    Location: [place] [address]    Approximate Arrival Time: 11:00 am  (Unless instructed differently by the pre-op call nurse)       Prep Tasks and Info:     A pre-op physical with your primary care doctor is required before surgery. This must be 10-30 days before surgery.  Since it required by anesthesia, your surgery will be cancelled if it's not done. Call your clinic asap to get this scheduled.    Review your medications with your primary care or prescribing physician; they will advise you which meds to stop and when, and when you can resume taking.  Certain medications like blood thinners, need to be stopped in advance of surgery to proceed safely.    Please shower the evening before and morning of surgery with Hibiclens soap.  This can be found at your local pharmacy.     Fasting instructions will be provided by the pre-op nurse who will call you 1-3 days before surgery.  Typically we advise normal food up to 8 hours before you arrive for surgery. Clear liquids only from then until 2 hours before you arrive surgery then nothing at all by mouth.  The nurse will review your specific instructions with you at the call.     Smoking impacts your body's ability to heal properly.  If you are a smoker, we strongly urge you to stop smoking 4-6 weeks before surgery. Plastic surgery patients ar required to be off nicotine for at least 8 weeks before surgery.    You will need an adult to drive you home and stay with you 24 hours after surgery. Public transportation or Medical Van Services are not permitted.    You may have one family member wait in the lobby at the surgery center during your surgery. Visitor restrictions are subject to  change, please verify with the pre-op nurse when they call.    If the community sees a new COVID19 surge, your procedure may need to be postponed. We will contact you if this happens. You will be screened for high-risk exposure to Covid-19 during the pre-op call.  We encourage you to quarantine yourself away from any Covid-19 people for 10 days before surgery to avoid possible last minute cancellations.   When you arrive to the surgery center, you will again be screened for COVID19 symptoms. If you screen positive, your surgery will need to be postponed.    We always encourage you to notify your insurance any time you have medical tests or procedures scheduled including surgery. The number is usually right on the back of your insurance card. Please call [facility] Cost of Care at [phone]  if you'd like a surgery quote.       Call our office if you have any questions! Thank you!

## 2023-11-06 ENCOUNTER — PATIENT OUTREACH (OUTPATIENT)
Dept: FAMILY MEDICINE | Facility: CLINIC | Age: 63
End: 2023-11-06
Payer: COMMERCIAL

## 2023-11-06 NOTE — TELEPHONE ENCOUNTER
Patient Quality Outreach    Patient is due for the following:   Hypertension -  Hypertension follow-up visit    Next Steps:   Patient has upcoming appointment, these items will be addressed at that time.    Type of outreach:    Chart review performed, no outreach needed.      Questions for provider review:    None           Ingrid Basurto MA

## 2023-11-08 ENCOUNTER — PATIENT OUTREACH (OUTPATIENT)
Dept: GASTROENTEROLOGY | Facility: CLINIC | Age: 63
End: 2023-11-08
Payer: COMMERCIAL

## 2024-03-25 ENCOUNTER — OFFICE VISIT (OUTPATIENT)
Dept: FAMILY MEDICINE | Facility: CLINIC | Age: 64
End: 2024-03-25
Payer: COMMERCIAL

## 2024-03-25 VITALS
DIASTOLIC BLOOD PRESSURE: 88 MMHG | OXYGEN SATURATION: 99 % | HEART RATE: 83 BPM | BODY MASS INDEX: 34.65 KG/M2 | HEIGHT: 74 IN | TEMPERATURE: 98.2 F | WEIGHT: 270 LBS | SYSTOLIC BLOOD PRESSURE: 138 MMHG | RESPIRATION RATE: 16 BRPM

## 2024-03-25 DIAGNOSIS — E78.00 PURE HYPERCHOLESTEROLEMIA: ICD-10-CM

## 2024-03-25 DIAGNOSIS — K42.9 UMBILICAL HERNIA WITHOUT OBSTRUCTION AND WITHOUT GANGRENE: ICD-10-CM

## 2024-03-25 DIAGNOSIS — Z01.818 PREOP GENERAL PHYSICAL EXAM: Primary | ICD-10-CM

## 2024-03-25 DIAGNOSIS — I10 ESSENTIAL HYPERTENSION, BENIGN: ICD-10-CM

## 2024-03-25 LAB
ANION GAP SERPL CALCULATED.3IONS-SCNC: 10 MMOL/L (ref 7–15)
BUN SERPL-MCNC: 20.1 MG/DL (ref 8–23)
CALCIUM SERPL-MCNC: 9.5 MG/DL (ref 8.8–10.2)
CHLORIDE SERPL-SCNC: 105 MMOL/L (ref 98–107)
CREAT SERPL-MCNC: 1.15 MG/DL (ref 0.67–1.17)
DEPRECATED HCO3 PLAS-SCNC: 26 MMOL/L (ref 22–29)
EGFRCR SERPLBLD CKD-EPI 2021: 72 ML/MIN/1.73M2
ERYTHROCYTE [DISTWIDTH] IN BLOOD BY AUTOMATED COUNT: 13.2 % (ref 10–15)
GLUCOSE SERPL-MCNC: 102 MG/DL (ref 70–99)
HCT VFR BLD AUTO: 47 % (ref 40–53)
HGB BLD-MCNC: 15.4 G/DL (ref 13.3–17.7)
MCH RBC QN AUTO: 29.2 PG (ref 26.5–33)
MCHC RBC AUTO-ENTMCNC: 32.8 G/DL (ref 31.5–36.5)
MCV RBC AUTO: 89 FL (ref 78–100)
PLATELET # BLD AUTO: 242 10E3/UL (ref 150–450)
POTASSIUM SERPL-SCNC: 4.5 MMOL/L (ref 3.4–5.3)
RBC # BLD AUTO: 5.27 10E6/UL (ref 4.4–5.9)
SODIUM SERPL-SCNC: 141 MMOL/L (ref 135–145)
WBC # BLD AUTO: 5.7 10E3/UL (ref 4–11)

## 2024-03-25 PROCEDURE — 93005 ELECTROCARDIOGRAM TRACING: CPT | Performed by: PHYSICIAN ASSISTANT

## 2024-03-25 PROCEDURE — 85027 COMPLETE CBC AUTOMATED: CPT | Performed by: PHYSICIAN ASSISTANT

## 2024-03-25 PROCEDURE — 80048 BASIC METABOLIC PNL TOTAL CA: CPT | Performed by: PHYSICIAN ASSISTANT

## 2024-03-25 PROCEDURE — 36415 COLL VENOUS BLD VENIPUNCTURE: CPT | Performed by: PHYSICIAN ASSISTANT

## 2024-03-25 PROCEDURE — 99214 OFFICE O/P EST MOD 30 MIN: CPT | Mod: 25 | Performed by: PHYSICIAN ASSISTANT

## 2024-03-25 RX ORDER — ROSUVASTATIN CALCIUM 20 MG/1
20 TABLET, COATED ORAL DAILY
Qty: 90 TABLET | Refills: 3 | Status: SHIPPED | OUTPATIENT
Start: 2024-03-25

## 2024-03-25 ASSESSMENT — ENCOUNTER SYMPTOMS
DYSURIA: 0
COUGH: 0
FEVER: 0
BRUISES/BLEEDS EASILY: 0
DIARRHEA: 0
PALPITATIONS: 0
DIZZINESS: 0
EYE PAIN: 0
NERVOUS/ANXIOUS: 0
ABDOMINAL PAIN: 0
SORE THROAT: 0
MYALGIAS: 0
PARESTHESIAS: 0
WEAKNESS: 0
CHILLS: 0
HEADACHES: 0
ARTHRALGIAS: 0
ALLERGIC/IMMUNOLOGIC NEGATIVE: 1
FREQUENCY: 0
CONSTIPATION: 0
SHORTNESS OF BREATH: 0

## 2024-03-25 NOTE — PATIENT INSTRUCTIONS
Hold losartan the morning of your surgery  Do not take any ibuprofen, Motrin, Aleve 7 days prior to your surgery  Do not take any multivitamins or herbal supplements 7 days prior to your surgery  Start Lipitor 20 mg daily after your surgery

## 2024-03-25 NOTE — H&P (VIEW-ONLY)
Preoperative Evaluation  [facility]  [address]  [place] [facility] [address]  Phone: [phone]  Fax: [phone]  Primary Provider: Jonas Dumont  Pre-op Performing Provider: JONAS DUMONT  Mar 25, 2024       Tim is a 63 year old, presenting for the following:  Pre-Op Exam (DOS 4/09/24 for umbilical hernia repair w/ Dr. Paz at [facility])        3/25/2024     9:31 AM   Additional Questions   Roomed by Ingrid Basurto   Accompanied by none     Surgical Information  Surgery/Procedure: Umbilical Hernia repair  Surgery Location: [facility]  Surgeon: Dr. Paz  Surgery Date: 4/9/24  Time of Surgery: TBD  Where patient plans to recover: At home with family  Fax number for surgical facility: [phone]    Assessment & Plan     The proposed surgical procedure is considered INTERMEDIATE risk.    Preop general physical exam  Umbilical hernia without obstruction and without gangrene  Muscle be undergoing a umbilical hernia repair on 4/9/2024 at [facility].  He is otherwise feeling well today.  Will update labs including a CBC, BMP and an EKG.  He denies any chest pain, shortness of breath, lightheaded or dizziness  - CBC with platelets; Future  - Basic metabolic panel; Future  - EKG 12-lead, tracing only    Essential Hypercholesterolemia  Historically has had elevated cholesterol levels in the past.  He states he was on cholesterol medications greater than 20 years ago.  His last lipid panel July 2023 did show a LDL of 135, triglyceride of 174, HDL of 50.  ASCVD score is 15%.  We did discuss medication and he would be agreeable to start Crestor 20 mg daily.  Side effects of the medication discussed.  Recommended starting this after his surgery.  He will follow-up in July for his annual physical and we will check a fasting lipid at that time  - rosuvastatin (CRESTOR) 20 MG tablet; Take 1 tablet (20  mg) by mouth daily    Essential hypertension, benign  Blood pressure well-controlled at 138/88.  Continue with losartan 100 mg but hold the morning of his surgery.       - No identified additional risk factors other than previously addressed    Antiplatelet or Anticoagulation Medication Instructions   - Patient is on no antiplatelet or anticoagulation medications.    Additional Medication Instructions  Please see patient instructions    Recommendation  APPROVAL GIVEN to proceed with proposed procedure, without further diagnostic evaluation.      Subjective       HPI related to upcoming procedure: Tim is a pleasant 63-year-old male who presents to the clinic today for a preop physical.  He will be undergoing a umbilical hernia repair on 4/9/2024 with Dr. Childress at [facility].  He is otherwise feeling well today.  Denies any chest pain, shortness of breath, lightheaded or dizziness.  Past medical history significant for hypertension, hyperlipidemia, and erectile dysfunction        3/25/2024     9:34 AM   Preop Questions   1. Have you ever had a heart attack or stroke? No   2. Have you ever had surgery on your heart or blood vessels, such as a stent placement, a coronary artery bypass, or surgery on an artery in your head, neck, heart, or legs? No   3. Do you have chest pain with activity? No   4. Do you have a history of  heart failure? No   5. Do you currently have a cold, bronchitis or symptoms of other infection? No   6. Do you have a cough, shortness of breath, or wheezing? No   7. Do you or anyone in your family have previous history of blood clots? No   8. Do you or does anyone in your family have a serious bleeding problem such as prolonged bleeding following surgeries or cuts? No   9. Have you ever had problems with anemia or been told to take iron pills? No   10. Have you had any abnormal blood loss such as black, tarry or bloody stools? No   11. Have you ever had a blood transfusion? No    12. Are you willing to have a blood transfusion if it is medically needed before, during, or after your surgery? Yes   13. Have you or any of your relatives ever had problems with anesthesia? YES- Nausea and vomiting   14. Do you have sleep apnea, excessive snoring or daytime drowsiness? No   15. Do you have any artifical heart valves or other implanted medical devices like a pacemaker, defibrillator, or continuous glucose monitor? No   16. Do you have artificial joints? No   17. Are you allergic to latex? No         Patient Active Problem List    Diagnosis Date Noted    Hx of adenomatous colonic polyps 08/27/2021     Priority: Medium    Erectile dysfunction, unspecified erectile dysfunction type 08/27/2021     Priority: Medium    Benign neoplasm of descending colon 07/22/2021     Priority: Medium    Kidney stone 03/20/2017     Priority: Medium    Elevated BP 03/20/2017     Priority: Medium    Obesity      Priority: Medium     Created by Conversion        Essential Hypercholesterolemia      Priority: Medium     Created by Conversion          Past Medical History:   Diagnosis Date    Essential hypertension, malignant     Essential hypertension, malignant     Hypercholesterolemia     Hypercholesterolemia     Nasal septal deviation     Nasal septal deviation      No past surgical history on file.  Current Outpatient Medications   Medication Sig Dispense Refill    ciclopirox (PENLAC) 8 % external solution Apply to adjacent skin and affected nails daily.  Remove with alcohol every 7 days, then repeat. 6.6 mL 1    losartan (COZAAR) 100 MG tablet Take 1 tablet (100 mg) by mouth daily 90 tablet 3    Multiple Vitamins-Minerals (CENTRUM SILVER ADULT 50+) TABS Take 1 tablet by mouth every 24 hours      rosuvastatin (CRESTOR) 20 MG tablet Take 1 tablet (20 mg) by mouth daily 90 tablet 3    tadalafil (CIALIS) 20 MG tablet Take 1 tablet (20 mg) by mouth daily as needed (sexual activity) 30 tablet 4       No Known Allergies  "    Social History     Tobacco Use    Smoking status: Never     Passive exposure: Never    Smokeless tobacco: Never   Substance Use Topics    Alcohol use: Not on file     Comment: Alcoholic Drinks/day: less than 1 beer/week     Family History   Problem Relation Age of Onset    Alcoholism Paternal Grandfather     Hyperlipidemia Mother     Glaucoma Mother     No Known Problems Father         bladder cancer    Lymphoma Daughter         hodgkins     History   Drug Use Unknown         Review of Systems   Constitutional:  Negative for chills and fever.   HENT:  Negative for congestion, ear pain, hearing loss and sore throat.    Eyes:  Negative for pain and visual disturbance.   Respiratory:  Negative for cough and shortness of breath.    Cardiovascular:  Negative for chest pain, palpitations and peripheral edema.   Gastrointestinal:  Negative for abdominal pain, constipation and diarrhea.   Endocrine: Negative for polyuria.   Genitourinary:  Negative for dysuria and frequency.   Musculoskeletal:  Negative for arthralgias and myalgias.   Skin:  Negative for rash.   Allergic/Immunologic: Negative.    Neurological:  Negative for dizziness, weakness, headaches and paresthesias.   Hematological:  Does not bruise/bleed easily.   Psychiatric/Behavioral:  Negative for mood changes. The patient is not nervous/anxious.        Objective    /88 (BP Location: Right arm, Patient Position: Sitting, Cuff Size: Adult Regular)   Pulse 83   Temp 98.2  F (36.8  C) (Oral)   Resp 16   Ht 1.88 m (6' 2\")   Wt 122.5 kg (270 lb)   SpO2 99%   BMI 34.67 kg/m     Estimated body mass index is 34.67 kg/m  as calculated from the following:    Height as of this encounter: 1.88 m (6' 2\").    Weight as of this encounter: 122.5 kg (270 lb).  Physical Exam  Vitals and nursing note reviewed.   Constitutional:       General: He is not in acute distress.     Appearance: Normal appearance. He is well-developed and well-groomed. He is not " ill-appearing or toxic-appearing.   HENT:      Head: Normocephalic and atraumatic.      Right Ear: Tympanic membrane, ear canal and external ear normal.      Left Ear: Tympanic membrane, ear canal and external ear normal.      Mouth/Throat:      Lips: Pink.      Mouth: Mucous membranes are moist.      Palate: No mass.      Pharynx: Oropharynx is clear. Uvula midline.      Tonsils: No tonsillar exudate or tonsillar abscesses.   Eyes:      General: Lids are normal.         Right eye: No discharge.         Left eye: No discharge.   Neck:      Trachea: Trachea normal.   Cardiovascular:      Rate and Rhythm: Normal rate and regular rhythm.      Heart sounds: S1 normal and S2 normal. No murmur heard.  Pulmonary:      Effort: Pulmonary effort is normal.      Breath sounds: Normal breath sounds and air entry.   Abdominal:      General: Bowel sounds are normal. There is no distension.      Palpations: Abdomen is soft.      Tenderness: There is no abdominal tenderness. There is no guarding or rebound.      Comments: Reproducible umbilical hernia.   Musculoskeletal:      Cervical back: Full passive range of motion without pain and neck supple.      Right lower leg: No edema.      Left lower leg: No edema.   Lymphadenopathy:      Cervical: No cervical adenopathy.   Skin:     General: Skin is warm and dry.      Findings: No lesion or rash.   Neurological:      General: No focal deficit present.      Mental Status: He is alert.      Cranial Nerves: No cranial nerve deficit.      Gait: Gait is intact.      Deep Tendon Reflexes:      Reflex Scores:       Patellar reflexes are 2+ on the right side and 2+ on the left side.  Psychiatric:         Attention and Perception: Attention normal.         Mood and Affect: Mood normal.         Speech: Speech normal.           Recent Labs   Lab Test 07/31/23  1117 07/11/22  1409   HGB 15.6 15.8    215    138   POTASSIUM 4.4 4.4   CR 1.11 1.03   A1C 6.0*  --          Diagnostics  Recent Results (from the past 240 hour(s))   EKG 12-lead, tracing only    Collection Time: 03/25/24 10:04 AM   Result Value Ref Range    Systolic Blood Pressure  mmHg    Diastolic Blood Pressure  mmHg    Ventricular Rate 68 BPM    Atrial Rate 68 BPM    NH Interval 188 ms    QRS Duration 148 ms     ms    QTc 478 ms    P Axis 40 degrees    R AXIS -56 degrees    T Axis 4 degrees    Interpretation ECG       Sinus rhythm  Right bundle branch block  Left anterior fascicular block  ** Bifascicular block **  Cannot rule out Inferior infarct (masked by fascicular block?) , age undetermined  Abnormal ECG  When compared with ECG of 25-AUG-2021 09:53,  (RBBB and left anterior fascicular block) is now Present            Revised Cardiac Risk Index (RCRI)  The patient has the following serious cardiovascular risks for perioperative complications:   - No serious cardiac risks = 0 points     RCRI Interpretation: 0 points: Class I (very low risk - 0.4% complication rate)         Signed Electronically by: Tolu Coyne PA-C  Copy of this evaluation report is provided to requesting physician.

## 2024-03-25 NOTE — PROGRESS NOTES
Preoperative Evaluation  [facility]  [address]  [place] [facility] [address]  Phone: [phone]  Fax: [phone]  Primary Provider: Jonas Dumont  Pre-op Performing Provider: JONAS DUMONT  Mar 25, 2024       Tim is a 63 year old, presenting for the following:  Pre-Op Exam (DOS 4/09/24 for umbilical hernia repair w/ Dr. Paz at [facility])        3/25/2024     9:31 AM   Additional Questions   Roomed by Ingrid Basurto   Accompanied by none     Surgical Information  Surgery/Procedure: Umbilical Hernia repair  Surgery Location: [facility]  Surgeon: Dr. Paz  Surgery Date: 4/9/24  Time of Surgery: TBD  Where patient plans to recover: At home with family  Fax number for surgical facility: [phone]    Assessment & Plan     The proposed surgical procedure is considered INTERMEDIATE risk.    Preop general physical exam  Umbilical hernia without obstruction and without gangrene  Muscle be undergoing a umbilical hernia repair on 4/9/2024 at [facility].  He is otherwise feeling well today.  Will update labs including a CBC, BMP and an EKG.  He denies any chest pain, shortness of breath, lightheaded or dizziness  - CBC with platelets; Future  - Basic metabolic panel; Future  - EKG 12-lead, tracing only    Essential Hypercholesterolemia  Historically has had elevated cholesterol levels in the past.  He states he was on cholesterol medications greater than 20 years ago.  His last lipid panel July 2023 did show a LDL of 135, triglyceride of 174, HDL of 50.  ASCVD score is 15%.  We did discuss medication and he would be agreeable to start Crestor 20 mg daily.  Side effects of the medication discussed.  Recommended starting this after his surgery.  He will follow-up in July for his annual physical and we will check a fasting lipid at that time  - rosuvastatin (CRESTOR) 20 MG tablet; Take 1 tablet (20  mg) by mouth daily    Essential hypertension, benign  Blood pressure well-controlled at 138/88.  Continue with losartan 100 mg but hold the morning of his surgery.       - No identified additional risk factors other than previously addressed    Antiplatelet or Anticoagulation Medication Instructions   - Patient is on no antiplatelet or anticoagulation medications.    Additional Medication Instructions  Please see patient instructions    Recommendation  APPROVAL GIVEN to proceed with proposed procedure, without further diagnostic evaluation.      Subjective       HPI related to upcoming procedure: Tim is a pleasant 63-year-old male who presents to the clinic today for a preop physical.  He will be undergoing a umbilical hernia repair on 4/9/2024 with Dr. Childress at [facility].  He is otherwise feeling well today.  Denies any chest pain, shortness of breath, lightheaded or dizziness.  Past medical history significant for hypertension, hyperlipidemia, and erectile dysfunction        3/25/2024     9:34 AM   Preop Questions   1. Have you ever had a heart attack or stroke? No   2. Have you ever had surgery on your heart or blood vessels, such as a stent placement, a coronary artery bypass, or surgery on an artery in your head, neck, heart, or legs? No   3. Do you have chest pain with activity? No   4. Do you have a history of  heart failure? No   5. Do you currently have a cold, bronchitis or symptoms of other infection? No   6. Do you have a cough, shortness of breath, or wheezing? No   7. Do you or anyone in your family have previous history of blood clots? No   8. Do you or does anyone in your family have a serious bleeding problem such as prolonged bleeding following surgeries or cuts? No   9. Have you ever had problems with anemia or been told to take iron pills? No   10. Have you had any abnormal blood loss such as black, tarry or bloody stools? No   11. Have you ever had a blood transfusion? No    12. Are you willing to have a blood transfusion if it is medically needed before, during, or after your surgery? Yes   13. Have you or any of your relatives ever had problems with anesthesia? YES- Nausea and vomiting   14. Do you have sleep apnea, excessive snoring or daytime drowsiness? No   15. Do you have any artifical heart valves or other implanted medical devices like a pacemaker, defibrillator, or continuous glucose monitor? No   16. Do you have artificial joints? No   17. Are you allergic to latex? No         Patient Active Problem List    Diagnosis Date Noted    Hx of adenomatous colonic polyps 08/27/2021     Priority: Medium    Erectile dysfunction, unspecified erectile dysfunction type 08/27/2021     Priority: Medium    Benign neoplasm of descending colon 07/22/2021     Priority: Medium    Kidney stone 03/20/2017     Priority: Medium    Elevated BP 03/20/2017     Priority: Medium    Obesity      Priority: Medium     Created by Conversion        Essential Hypercholesterolemia      Priority: Medium     Created by Conversion          Past Medical History:   Diagnosis Date    Essential hypertension, malignant     Essential hypertension, malignant     Hypercholesterolemia     Hypercholesterolemia     Nasal septal deviation     Nasal septal deviation      No past surgical history on file.  Current Outpatient Medications   Medication Sig Dispense Refill    ciclopirox (PENLAC) 8 % external solution Apply to adjacent skin and affected nails daily.  Remove with alcohol every 7 days, then repeat. 6.6 mL 1    losartan (COZAAR) 100 MG tablet Take 1 tablet (100 mg) by mouth daily 90 tablet 3    Multiple Vitamins-Minerals (CENTRUM SILVER ADULT 50+) TABS Take 1 tablet by mouth every 24 hours      rosuvastatin (CRESTOR) 20 MG tablet Take 1 tablet (20 mg) by mouth daily 90 tablet 3    tadalafil (CIALIS) 20 MG tablet Take 1 tablet (20 mg) by mouth daily as needed (sexual activity) 30 tablet 4       No Known Allergies  "    Social History     Tobacco Use    Smoking status: Never     Passive exposure: Never    Smokeless tobacco: Never   Substance Use Topics    Alcohol use: Not on file     Comment: Alcoholic Drinks/day: less than 1 beer/week     Family History   Problem Relation Age of Onset    Alcoholism Paternal Grandfather     Hyperlipidemia Mother     Glaucoma Mother     No Known Problems Father         bladder cancer    Lymphoma Daughter         hodgkins     History   Drug Use Unknown         Review of Systems   Constitutional:  Negative for chills and fever.   HENT:  Negative for congestion, ear pain, hearing loss and sore throat.    Eyes:  Negative for pain and visual disturbance.   Respiratory:  Negative for cough and shortness of breath.    Cardiovascular:  Negative for chest pain, palpitations and peripheral edema.   Gastrointestinal:  Negative for abdominal pain, constipation and diarrhea.   Endocrine: Negative for polyuria.   Genitourinary:  Negative for dysuria and frequency.   Musculoskeletal:  Negative for arthralgias and myalgias.   Skin:  Negative for rash.   Allergic/Immunologic: Negative.    Neurological:  Negative for dizziness, weakness, headaches and paresthesias.   Hematological:  Does not bruise/bleed easily.   Psychiatric/Behavioral:  Negative for mood changes. The patient is not nervous/anxious.        Objective    /88 (BP Location: Right arm, Patient Position: Sitting, Cuff Size: Adult Regular)   Pulse 83   Temp 98.2  F (36.8  C) (Oral)   Resp 16   Ht 1.88 m (6' 2\")   Wt 122.5 kg (270 lb)   SpO2 99%   BMI 34.67 kg/m     Estimated body mass index is 34.67 kg/m  as calculated from the following:    Height as of this encounter: 1.88 m (6' 2\").    Weight as of this encounter: 122.5 kg (270 lb).  Physical Exam  Vitals and nursing note reviewed.   Constitutional:       General: He is not in acute distress.     Appearance: Normal appearance. He is well-developed and well-groomed. He is not " ill-appearing or toxic-appearing.   HENT:      Head: Normocephalic and atraumatic.      Right Ear: Tympanic membrane, ear canal and external ear normal.      Left Ear: Tympanic membrane, ear canal and external ear normal.      Mouth/Throat:      Lips: Pink.      Mouth: Mucous membranes are moist.      Palate: No mass.      Pharynx: Oropharynx is clear. Uvula midline.      Tonsils: No tonsillar exudate or tonsillar abscesses.   Eyes:      General: Lids are normal.         Right eye: No discharge.         Left eye: No discharge.   Neck:      Trachea: Trachea normal.   Cardiovascular:      Rate and Rhythm: Normal rate and regular rhythm.      Heart sounds: S1 normal and S2 normal. No murmur heard.  Pulmonary:      Effort: Pulmonary effort is normal.      Breath sounds: Normal breath sounds and air entry.   Abdominal:      General: Bowel sounds are normal. There is no distension.      Palpations: Abdomen is soft.      Tenderness: There is no abdominal tenderness. There is no guarding or rebound.      Comments: Reproducible umbilical hernia.   Musculoskeletal:      Cervical back: Full passive range of motion without pain and neck supple.      Right lower leg: No edema.      Left lower leg: No edema.   Lymphadenopathy:      Cervical: No cervical adenopathy.   Skin:     General: Skin is warm and dry.      Findings: No lesion or rash.   Neurological:      General: No focal deficit present.      Mental Status: He is alert.      Cranial Nerves: No cranial nerve deficit.      Gait: Gait is intact.      Deep Tendon Reflexes:      Reflex Scores:       Patellar reflexes are 2+ on the right side and 2+ on the left side.  Psychiatric:         Attention and Perception: Attention normal.         Mood and Affect: Mood normal.         Speech: Speech normal.           Recent Labs   Lab Test 07/31/23  1117 07/11/22  1409   HGB 15.6 15.8    215    138   POTASSIUM 4.4 4.4   CR 1.11 1.03   A1C 6.0*  --          Diagnostics  Recent Results (from the past 240 hour(s))   EKG 12-lead, tracing only    Collection Time: 03/25/24 10:04 AM   Result Value Ref Range    Systolic Blood Pressure  mmHg    Diastolic Blood Pressure  mmHg    Ventricular Rate 68 BPM    Atrial Rate 68 BPM    TN Interval 188 ms    QRS Duration 148 ms     ms    QTc 478 ms    P Axis 40 degrees    R AXIS -56 degrees    T Axis 4 degrees    Interpretation ECG       Sinus rhythm  Right bundle branch block  Left anterior fascicular block  ** Bifascicular block **  Cannot rule out Inferior infarct (masked by fascicular block?) , age undetermined  Abnormal ECG  When compared with ECG of 25-AUG-2021 09:53,  (RBBB and left anterior fascicular block) is now Present            Revised Cardiac Risk Index (RCRI)  The patient has the following serious cardiovascular risks for perioperative complications:   - No serious cardiac risks = 0 points     RCRI Interpretation: 0 points: Class I (very low risk - 0.4% complication rate)         Signed Electronically by: Tolu Coyne PA-C  Copy of this evaluation report is provided to requesting physician.

## 2024-04-03 ENCOUNTER — PREP FOR PROCEDURE (OUTPATIENT)
Dept: SURGERY | Facility: CLINIC | Age: 64
End: 2024-04-03
Payer: COMMERCIAL

## 2024-04-03 DIAGNOSIS — K42.9 UMBILICAL HERNIA WITHOUT OBSTRUCTION AND WITHOUT GANGRENE: Primary | ICD-10-CM

## 2024-04-04 PROBLEM — K42.9 UMBILICAL HERNIA WITHOUT OBSTRUCTION AND WITHOUT GANGRENE: Status: ACTIVE | Noted: 2024-04-03

## 2024-04-08 ENCOUNTER — ANESTHESIA EVENT (OUTPATIENT)
Dept: SURGERY | Facility: AMBULATORY SURGERY CENTER | Age: 64
End: 2024-04-08
Payer: COMMERCIAL

## 2024-04-09 ENCOUNTER — HOSPITAL ENCOUNTER (OUTPATIENT)
Facility: AMBULATORY SURGERY CENTER | Age: 64
Discharge: HOME | End: 2024-04-09
Attending: SURGERY
Payer: COMMERCIAL

## 2024-04-09 ENCOUNTER — ANESTHESIA (OUTPATIENT)
Dept: SURGERY | Facility: AMBULATORY SURGERY CENTER | Age: 64
End: 2024-04-09
Payer: COMMERCIAL

## 2024-04-09 VITALS
BODY MASS INDEX: 34.65 KG/M2 | HEIGHT: 74 IN | RESPIRATION RATE: 18 BRPM | HEART RATE: 61 BPM | DIASTOLIC BLOOD PRESSURE: 88 MMHG | TEMPERATURE: 97 F | OXYGEN SATURATION: 92 % | WEIGHT: 270 LBS | SYSTOLIC BLOOD PRESSURE: 134 MMHG

## 2024-04-09 DIAGNOSIS — K42.9 UMBILICAL HERNIA WITHOUT OBSTRUCTION AND WITHOUT GANGRENE: ICD-10-CM

## 2024-04-09 PROCEDURE — 49592 RPR AA HRN 1ST < 3 NCR/STRN: CPT | Performed by: SURGERY

## 2024-04-09 DEVICE — PATCH, HERNIA, SMALL, CIRCLE WITH STRAP, VENTRALEX ST, 1.7IN (4.3CM) DIAMETER 5950007: Type: IMPLANTABLE DEVICE | Site: UMBILICAL | Status: FUNCTIONAL

## 2024-04-09 RX ORDER — PROPOFOL 10 MG/ML
INJECTION, EMULSION INTRAVENOUS PRN
Status: DISCONTINUED | OUTPATIENT
Start: 2024-04-09 | End: 2024-04-09

## 2024-04-09 RX ORDER — CEFAZOLIN SODIUM 2 G/100ML
2 INJECTION, SOLUTION INTRAVENOUS
Status: COMPLETED | OUTPATIENT
Start: 2024-04-09 | End: 2024-04-09

## 2024-04-09 RX ORDER — OXYCODONE HYDROCHLORIDE 5 MG/1
5 TABLET ORAL
Status: DISCONTINUED | OUTPATIENT
Start: 2024-04-09 | End: 2024-04-10 | Stop reason: HOSPADM

## 2024-04-09 RX ORDER — FENTANYL CITRATE 50 UG/ML
INJECTION, SOLUTION INTRAMUSCULAR; INTRAVENOUS PRN
Status: DISCONTINUED | OUTPATIENT
Start: 2024-04-09 | End: 2024-04-09

## 2024-04-09 RX ORDER — KETOROLAC TROMETHAMINE 30 MG/ML
INJECTION, SOLUTION INTRAMUSCULAR; INTRAVENOUS PRN
Status: DISCONTINUED | OUTPATIENT
Start: 2024-04-09 | End: 2024-04-09

## 2024-04-09 RX ORDER — DEXMEDETOMIDINE HYDROCHLORIDE 4 UG/ML
INJECTION, SOLUTION INTRAVENOUS PRN
Status: DISCONTINUED | OUTPATIENT
Start: 2024-04-09 | End: 2024-04-09

## 2024-04-09 RX ORDER — BUPIVACAINE HYDROCHLORIDE AND EPINEPHRINE 2.5; 5 MG/ML; UG/ML
INJECTION, SOLUTION INFILTRATION; PERINEURAL PRN
Status: DISCONTINUED | OUTPATIENT
Start: 2024-04-09 | End: 2024-04-09 | Stop reason: HOSPADM

## 2024-04-09 RX ORDER — ONDANSETRON 2 MG/ML
4 INJECTION INTRAMUSCULAR; INTRAVENOUS EVERY 30 MIN PRN
Status: DISCONTINUED | OUTPATIENT
Start: 2024-04-09 | End: 2024-04-10 | Stop reason: HOSPADM

## 2024-04-09 RX ORDER — OXYCODONE HYDROCHLORIDE 10 MG/1
10 TABLET ORAL
Status: DISCONTINUED | OUTPATIENT
Start: 2024-04-09 | End: 2024-04-10 | Stop reason: HOSPADM

## 2024-04-09 RX ORDER — ONDANSETRON 4 MG/1
4 TABLET, ORALLY DISINTEGRATING ORAL EVERY 30 MIN PRN
Status: DISCONTINUED | OUTPATIENT
Start: 2024-04-09 | End: 2024-04-10 | Stop reason: HOSPADM

## 2024-04-09 RX ORDER — LIDOCAINE 40 MG/G
CREAM TOPICAL
Status: DISCONTINUED | OUTPATIENT
Start: 2024-04-09 | End: 2024-04-10 | Stop reason: HOSPADM

## 2024-04-09 RX ORDER — ACETAMINOPHEN 325 MG/1
975 TABLET ORAL ONCE
Status: COMPLETED | OUTPATIENT
Start: 2024-04-09 | End: 2024-04-09

## 2024-04-09 RX ORDER — GLYCOPYRROLATE 0.2 MG/ML
INJECTION, SOLUTION INTRAMUSCULAR; INTRAVENOUS PRN
Status: DISCONTINUED | OUTPATIENT
Start: 2024-04-09 | End: 2024-04-09

## 2024-04-09 RX ORDER — SODIUM CHLORIDE, SODIUM LACTATE, POTASSIUM CHLORIDE, CALCIUM CHLORIDE 600; 310; 30; 20 MG/100ML; MG/100ML; MG/100ML; MG/100ML
INJECTION, SOLUTION INTRAVENOUS CONTINUOUS
Status: DISCONTINUED | OUTPATIENT
Start: 2024-04-09 | End: 2024-04-10 | Stop reason: HOSPADM

## 2024-04-09 RX ORDER — LIDOCAINE HYDROCHLORIDE 20 MG/ML
INJECTION, SOLUTION INFILTRATION; PERINEURAL PRN
Status: DISCONTINUED | OUTPATIENT
Start: 2024-04-09 | End: 2024-04-09

## 2024-04-09 RX ORDER — ONDANSETRON 2 MG/ML
INJECTION INTRAMUSCULAR; INTRAVENOUS PRN
Status: DISCONTINUED | OUTPATIENT
Start: 2024-04-09 | End: 2024-04-09

## 2024-04-09 RX ORDER — NALOXONE HYDROCHLORIDE 0.4 MG/ML
0.1 INJECTION, SOLUTION INTRAMUSCULAR; INTRAVENOUS; SUBCUTANEOUS
Status: DISCONTINUED | OUTPATIENT
Start: 2024-04-09 | End: 2024-04-10 | Stop reason: HOSPADM

## 2024-04-09 RX ORDER — DEXAMETHASONE SODIUM PHOSPHATE 4 MG/ML
INJECTION, SOLUTION INTRA-ARTICULAR; INTRALESIONAL; INTRAMUSCULAR; INTRAVENOUS; SOFT TISSUE PRN
Status: DISCONTINUED | OUTPATIENT
Start: 2024-04-09 | End: 2024-04-09

## 2024-04-09 RX ORDER — FENTANYL CITRATE 0.05 MG/ML
25 INJECTION, SOLUTION INTRAMUSCULAR; INTRAVENOUS
Status: DISCONTINUED | OUTPATIENT
Start: 2024-04-09 | End: 2024-04-10 | Stop reason: HOSPADM

## 2024-04-09 RX ORDER — PROPOFOL 10 MG/ML
INJECTION, EMULSION INTRAVENOUS CONTINUOUS PRN
Status: DISCONTINUED | OUTPATIENT
Start: 2024-04-09 | End: 2024-04-09

## 2024-04-09 RX ADMIN — FENTANYL CITRATE 25 MCG: 50 INJECTION, SOLUTION INTRAMUSCULAR; INTRAVENOUS at 09:59

## 2024-04-09 RX ADMIN — PROPOFOL 200 MCG/KG/MIN: 10 INJECTION, EMULSION INTRAVENOUS at 09:58

## 2024-04-09 RX ADMIN — ACETAMINOPHEN 975 MG: 325 TABLET ORAL at 09:29

## 2024-04-09 RX ADMIN — LIDOCAINE HYDROCHLORIDE 2 ML: 20 INJECTION, SOLUTION INFILTRATION; PERINEURAL at 09:58

## 2024-04-09 RX ADMIN — PROPOFOL 30 MG: 10 INJECTION, EMULSION INTRAVENOUS at 09:59

## 2024-04-09 RX ADMIN — SODIUM CHLORIDE, SODIUM LACTATE, POTASSIUM CHLORIDE, CALCIUM CHLORIDE: 600; 310; 30; 20 INJECTION, SOLUTION INTRAVENOUS at 09:38

## 2024-04-09 RX ADMIN — KETOROLAC TROMETHAMINE 15 MG: 30 INJECTION, SOLUTION INTRAMUSCULAR; INTRAVENOUS at 10:34

## 2024-04-09 RX ADMIN — CEFAZOLIN SODIUM 2 G: 2 INJECTION, SOLUTION INTRAVENOUS at 09:52

## 2024-04-09 RX ADMIN — FENTANYL CITRATE 50 MCG: 50 INJECTION, SOLUTION INTRAMUSCULAR; INTRAVENOUS at 09:58

## 2024-04-09 RX ADMIN — GLYCOPYRROLATE 0.2 MG: 0.2 INJECTION, SOLUTION INTRAMUSCULAR; INTRAVENOUS at 09:58

## 2024-04-09 RX ADMIN — DEXAMETHASONE SODIUM PHOSPHATE 4 MG: 4 INJECTION, SOLUTION INTRA-ARTICULAR; INTRALESIONAL; INTRAMUSCULAR; INTRAVENOUS; SOFT TISSUE at 10:03

## 2024-04-09 RX ADMIN — DEXMEDETOMIDINE HYDROCHLORIDE 4 MCG: 4 INJECTION, SOLUTION INTRAVENOUS at 10:06

## 2024-04-09 RX ADMIN — FENTANYL CITRATE 25 MCG: 50 INJECTION, SOLUTION INTRAMUSCULAR; INTRAVENOUS at 10:07

## 2024-04-09 RX ADMIN — DEXMEDETOMIDINE HYDROCHLORIDE 4 MCG: 4 INJECTION, SOLUTION INTRAVENOUS at 10:08

## 2024-04-09 RX ADMIN — ONDANSETRON 4 MG: 2 INJECTION INTRAMUSCULAR; INTRAVENOUS at 10:03

## 2024-04-09 NOTE — ANESTHESIA PREPROCEDURE EVALUATION
"Anesthesia Pre-Procedure Evaluation    Patient: Tim Rutherford   MRN: [id number] : 1960        Procedure : Procedure(s):  HERNIORRHAPHY, UMBILICAL, OPEN          Past Medical History:   Diagnosis Date    Essential hypertension, malignant     Essential hypertension, malignant     Hypercholesterolemia     Hypercholesterolemia     Nasal septal deviation     Nasal septal deviation     PONV (postoperative nausea and vomiting)       Past Surgical History:   Procedure Laterality Date    COLONOSCOPY      WISDOM TOOTH EXTRACTION      \"teens\"      No Known Allergies   Social History     Tobacco Use    Smoking status: Never     Passive exposure: Never    Smokeless tobacco: Never   Substance Use Topics    Alcohol use: Not Currently     Comment: Alcoholic Drinks/day: less than 1 beer/week      Wt Readings from Last 1 Encounters:   24 122.5 kg (270 lb)        Anesthesia Evaluation   Pt has had prior anesthetic. Type: General.    History of anesthetic complications  - PONV.      ROS/MED HX  ENT/Pulmonary:  - neg pulmonary ROS     Neurologic:  - neg neurologic ROS     Cardiovascular:  - neg cardiovascular ROS   (+) Dyslipidemia hypertension- -   -  - -                                      METS/Exercise Tolerance: >4 METS    Hematologic:  - neg hematologic  ROS     Musculoskeletal:  - neg musculoskeletal ROS     GI/Hepatic:  - neg GI/hepatic ROS     Renal/Genitourinary:  - neg Renal ROS   (+)       Nephrolithiasis ,       Endo:  - neg endo ROS   (+)               Obesity (bmi 35),       Psychiatric/Substance Use:  - neg psychiatric ROS     Infectious Disease:  - neg infectious disease ROS     Malignancy:  - neg malignancy ROS     Other:  - neg other ROS          Physical Exam    Airway        Mallampati: II   TM distance: > 3 FB   Neck ROM: full   Mouth opening: > 3 cm    Respiratory Devices and Support         Dental       (+) Minor Abnormalities - some fillings, tiny chips      Cardiovascular   cardiovascular exam " "normal          Pulmonary   pulmonary exam normal                OUTSIDE LABS:  CBC:   Lab Results   Component Value Date    WBC 5.7 03/25/2024    WBC 7.5 07/31/2023    HGB 15.4 03/25/2024    HGB 15.6 07/31/2023    HCT 47.0 03/25/2024    HCT 46.1 07/31/2023     03/25/2024     07/31/2023     BMP:   Lab Results   Component Value Date     03/25/2024     07/31/2023    POTASSIUM 4.5 03/25/2024    POTASSIUM 4.4 07/31/2023    CHLORIDE 105 03/25/2024    CHLORIDE 104 07/31/2023    CO2 26 03/25/2024    CO2 23 07/31/2023    BUN 20.1 03/25/2024    BUN 16.6 07/31/2023    CR 1.15 03/25/2024    CR 1.11 07/31/2023     (H) 03/25/2024     (H) 07/31/2023     COAGS: No results found for: \"PTT\", \"INR\", \"FIBR\"  POC: No results found for: \"BGM\", \"HCG\", \"HCGS\"  HEPATIC:   Lab Results   Component Value Date    ALBUMIN 4.9 07/31/2023    PROTTOTAL 7.8 07/31/2023    ALT 20 07/31/2023    AST 25 07/31/2023    ALKPHOS 39 (L) 07/31/2023    BILITOTAL 0.5 07/31/2023     OTHER:   Lab Results   Component Value Date    A1C 6.0 (H) 07/31/2023    SHAHEEN 9.5 03/25/2024       Anesthesia Plan    ASA Status:  2    NPO Status:  NPO Appropriate    Anesthesia Type: MAC.     - Reason for MAC: immobility needed, straight local not clinically adequate   Induction: Propofol.   Maintenance: TIVA.        Consents    Anesthesia Plan(s) and associated risks, benefits, and realistic alternatives discussed. Questions answered and patient/representative(s) expressed understanding.     - Discussed:     - Discussed with:  Patient            Postoperative Care    Pain management: Multi-modal analgesia.   PONV prophylaxis: Ondansetron (or other 5HT-3), Dexamethasone or Solumedrol     Comments:    Other Comments: Toradol if OK with surgeon    Reviewed anesthetic options and risks. Patient agrees to proceed.            Rocael Bowman MD    I have reviewed the pertinent notes and labs in the chart from the past 30 days and (re)examined the " "patient.  Any updates or changes from those notes are reflected in this note.              # Obesity: Estimated body mass index is 34.67 kg/m  as calculated from the following:    Height as of 3/25/24: 1.88 m (6' 2\").    Weight as of 3/25/24: 122.5 kg (270 lb).      "

## 2024-04-09 NOTE — OP NOTE
Name:  Tim Rutherford  PCP:  Tolu Coyne  Procedure Date:  4/9/2024      Procedure(s):  HERNIORRHAPHY, UMBILICAL, OPEN    Pre-Procedure Diagnosis:  Umbilical hernia without obstruction and without gangrene [K42.9]     Post-Procedure Diagnosis:    1 cm umbilical hernia    Surgeon(s):  Goldy Paz DO    Circulator: Shelley Tapia RN  Scrub Person: Kyara Watkins    Anesthesia Type: Local MAC      Findings:  1 cm umbilical hernia with incarcerated preperitoneal adipose tissue    Operative Report:    The patient was taken the operating room placed in a supine position.  The abdomen was prepped and draped sterile fashion.  Antibiotics were given prior skin incision.  I made a curvilinear incision below the umbilicus after injecting local anesthetic.  I then  the umbilicus off the abdominal wall with electrocautery and blunt dissection.  I then reduced a small hernia sac with preperitoneal adipose tissue back into the preperitoneal space.  There was a marble sized portion of preperitoneal adipose tissue that was transected and sent off the field.  The edges of the hernia were cleared off and I placed a 4.3 cm coated mesh into the preperitoneal space.  This was sutured in place to the abdominal wall circumferentially with 0 Prolene suture.  The wound was irrigated and the defect was closed with 0 Ethibond sutures in a transverse fashion.  I then reapproximated the umbilical stalk to the abdominal wall with an 0 Vicryl suture.  The wound was closed with 0 Vicryl suture and a 4-0 Monocryl suture.  I then cleaned and cover the wound with Steri-Strips and a dry sterile dressing.  The patient was brought to sedation sent to the PACU to undergo recovery.    Estimated Blood Loss:   5 cc    Specimens:    * No specimens in log *       Drains:        Complications:    None    Goldy Paz DO

## 2024-04-09 NOTE — INTERVAL H&P NOTE
"I have reviewed the surgical (or preoperative) H&P that is linked to this encounter, and examined the patient. There are no significant changes    Clinical Conditions Present on Arrival:  Clinically Significant Risk Factors Present on Admission                  # Obesity: Estimated body mass index is 34.67 kg/m  as calculated from the following:    Height as of 3/25/24: 1.88 m (6' 2\").    Weight as of 3/25/24: 122.5 kg (270 lb).     Plan for open umbilical hernia repair    [facility]  [phone]    "

## 2024-04-09 NOTE — DISCHARGE INSTRUCTIONS
If you have any questions or concerns regarding your procedure, please contact Dr. Paz, his office number is [phone].     You have received 975 mg of Acetaminophen (Tylenol) at 9:30 AM. Please do not take an additional dose of Tylenol until after 3:30 PM     Do not exceed 4,000 mg of acetaminophen during a 24 hour period and keep in mind that acetaminophen can also be found in many over-the-counter cold medications as well as narcotics that may be given for pain.      You received a medication called Toradol (a stronger IV ibuprofen) at 10:30 AM. Do NOT take any Ibuprofen / Advil / Aleve / Naproxen or products containing Ibuprofen until 4:30 PM or later.

## 2024-04-09 NOTE — ANESTHESIA POSTPROCEDURE EVALUATION
Patient: Tim Rutherford    Procedure: Procedure(s):  HERNIORRHAPHY, UMBILICAL, OPEN       Anesthesia Type:  MAC    Note:  Disposition: Outpatient   Postop Pain Control: Uneventful            Sign Out: Well controlled pain   PONV: No   Neuro/Psych: Uneventful            Sign Out: Acceptable/Baseline neuro status   Airway/Respiratory: Uneventful            Sign Out: Acceptable/Baseline resp. status   CV/Hemodynamics: Uneventful            Sign Out: Acceptable CV status; No obvious hypovolemia; No obvious fluid overload   Other NRE: NONE   DID A NON-ROUTINE EVENT OCCUR? No           Last vitals:  Vitals Value Taken Time   /88 04/09/24 1103   Temp 97  F (36.1  C) 04/09/24 1041   Pulse 69 04/09/24 1112   Resp 18 04/09/24 1102   SpO2 91 % 04/09/24 1112   Vitals shown include unfiled device data.    Electronically Signed By: Rocael Bowman MD  April 9, 2024  11:14 AM

## 2024-04-09 NOTE — ANESTHESIA CARE TRANSFER NOTE
Patient: Tim Rutherford    Procedure: Procedure(s):  HERNIORRHAPHY, UMBILICAL, OPEN       Diagnosis: Umbilical hernia without obstruction and without gangrene [K42.9]  Diagnosis Additional Information: No value filed.    Anesthesia Type:   MAC     Note:    Oropharynx: oropharynx clear of all foreign objects and spontaneously breathing  Level of Consciousness: drowsy  Oxygen Supplementation: room air    Independent Airway: airway patency satisfactory and stable  Dentition: dentition unchanged  Vital Signs Stable: post-procedure vital signs reviewed and stable  Report to RN Given: handoff report given  Patient transferred to: Phase II    Handoff Report: Identifed the Patient, Identified the Reponsible Provider, Reviewed the pertinent medical history, Discussed the surgical course, Reviewed Intra-OP anesthesia mangement and issues during anesthesia, Set expectations for post-procedure period and Allowed opportunity for questions and acknowledgement of understanding      Vitals:  Vitals Value Taken Time   /77 04/09/24 1041   Temp 97  F (36.1  C) 04/09/24 1041   Pulse 78 04/09/24 1042   Resp 18 04/09/24 1041   SpO2 91 % 04/09/24 1042       Electronically Signed By: ALICIA Lyle CRNA  April 9, 2024  10:43 AM  
no

## 2024-04-16 ENCOUNTER — TELEPHONE (OUTPATIENT)
Dept: SURGERY | Facility: CLINIC | Age: 64
End: 2024-04-16
Payer: COMMERCIAL

## 2024-04-16 DIAGNOSIS — Z48.89 POSTOPERATIVE VISIT: Primary | ICD-10-CM

## 2024-04-16 NOTE — CONFIDENTIAL NOTE
General Surgery Postoperative Telephone Check-In    Telemedicine Visit: The patient's condition can be safely assessed and treated via telephone telemedicine encounter.      Mode of Communication: Telephone    HPI: Patient is s/p open umbilical hernia repair with Dr. Paz on 4/9/24.  He is doing well. Pain is well controlled: Yes. No difficulties with the surgical wound.  He is eating and drinking well. Denies fever, chills, nausea, vomiting. Bowel function has returned to normal.    Assessment/Plan: Doing well after surgery and should follow up as needed.    This is a non-billable telephone postoperative check-in.    Brandy Bailey PA-C  [facility]  [address]  [address] [place] [id number]

## 2024-07-15 LAB
ATRIAL RATE - MUSE: 68 BPM
DIASTOLIC BLOOD PRESSURE - MUSE: NORMAL MMHG
INTERPRETATION ECG - MUSE: NORMAL
P AXIS - MUSE: 40 DEGREES
PR INTERVAL - MUSE: 188 MS
QRS DURATION - MUSE: 148 MS
QT - MUSE: 450 MS
QTC - MUSE: 478 MS
R AXIS - MUSE: -56 DEGREES
SYSTOLIC BLOOD PRESSURE - MUSE: NORMAL MMHG
T AXIS - MUSE: 4 DEGREES
VENTRICULAR RATE- MUSE: 68 BPM

## 2024-08-27 DIAGNOSIS — I10 ESSENTIAL HYPERTENSION, BENIGN: ICD-10-CM

## 2024-08-27 RX ORDER — LOSARTAN POTASSIUM 100 MG/1
100 TABLET ORAL DAILY
Qty: 90 TABLET | Refills: 1 | Status: SHIPPED | OUTPATIENT
Start: 2024-08-27

## 2024-10-02 ENCOUNTER — TRANSFERRED RECORDS (OUTPATIENT)
Dept: HEALTH INFORMATION MANAGEMENT | Facility: CLINIC | Age: 64
End: 2024-10-02
Payer: COMMERCIAL

## 2025-03-03 ENCOUNTER — OFFICE VISIT (OUTPATIENT)
Dept: FAMILY MEDICINE | Facility: CLINIC | Age: 65
End: 2025-03-03
Payer: COMMERCIAL

## 2025-03-03 VITALS
RESPIRATION RATE: 17 BRPM | SYSTOLIC BLOOD PRESSURE: 138 MMHG | OXYGEN SATURATION: 99 % | TEMPERATURE: 97.9 F | HEART RATE: 87 BPM | BODY MASS INDEX: 35.29 KG/M2 | DIASTOLIC BLOOD PRESSURE: 88 MMHG | WEIGHT: 275 LBS | HEIGHT: 74 IN

## 2025-03-03 DIAGNOSIS — Z12.5 SCREENING FOR PROSTATE CANCER: ICD-10-CM

## 2025-03-03 DIAGNOSIS — R09.81 SINUS CONGESTION: ICD-10-CM

## 2025-03-03 DIAGNOSIS — Z00.00 ANNUAL PHYSICAL EXAM: Primary | ICD-10-CM

## 2025-03-03 DIAGNOSIS — R73.03 PRE-DIABETES: ICD-10-CM

## 2025-03-03 DIAGNOSIS — E66.01 CLASS 2 SEVERE OBESITY WITH BODY MASS INDEX (BMI) OF 35 TO 39.9 WITH SERIOUS COMORBIDITY (H): ICD-10-CM

## 2025-03-03 DIAGNOSIS — I10 ESSENTIAL HYPERTENSION, BENIGN: ICD-10-CM

## 2025-03-03 DIAGNOSIS — E66.812 CLASS 2 SEVERE OBESITY WITH BODY MASS INDEX (BMI) OF 35 TO 39.9 WITH SERIOUS COMORBIDITY (H): ICD-10-CM

## 2025-03-03 DIAGNOSIS — N52.9 ERECTILE DYSFUNCTION, UNSPECIFIED ERECTILE DYSFUNCTION TYPE: ICD-10-CM

## 2025-03-03 DIAGNOSIS — E78.00 PURE HYPERCHOLESTEROLEMIA: ICD-10-CM

## 2025-03-03 LAB
ALBUMIN SERPL BCG-MCNC: 4.6 G/DL (ref 3.5–5.2)
ALP SERPL-CCNC: 39 U/L (ref 40–150)
ALT SERPL W P-5'-P-CCNC: 36 U/L (ref 0–70)
ANION GAP SERPL CALCULATED.3IONS-SCNC: 11 MMOL/L (ref 7–15)
AST SERPL W P-5'-P-CCNC: 30 U/L (ref 0–45)
BILIRUB SERPL-MCNC: 0.5 MG/DL
BUN SERPL-MCNC: 16.6 MG/DL (ref 8–23)
CALCIUM SERPL-MCNC: 10.1 MG/DL (ref 8.8–10.4)
CHLORIDE SERPL-SCNC: 106 MMOL/L (ref 98–107)
CHOLEST SERPL-MCNC: 137 MG/DL
CREAT SERPL-MCNC: 1.24 MG/DL (ref 0.67–1.17)
EGFRCR SERPLBLD CKD-EPI 2021: 65 ML/MIN/1.73M2
ERYTHROCYTE [DISTWIDTH] IN BLOOD BY AUTOMATED COUNT: 12.8 % (ref 10–15)
EST. AVERAGE GLUCOSE BLD GHB EST-MCNC: 126 MG/DL
FASTING STATUS PATIENT QL REPORTED: YES
FASTING STATUS PATIENT QL REPORTED: YES
GLUCOSE SERPL-MCNC: 106 MG/DL (ref 70–99)
HBA1C MFR BLD: 6 % (ref 0–5.6)
HCO3 SERPL-SCNC: 25 MMOL/L (ref 22–29)
HCT VFR BLD AUTO: 49.2 % (ref 40–53)
HDLC SERPL-MCNC: 51 MG/DL
HGB BLD-MCNC: 16.5 G/DL (ref 13.3–17.7)
LDLC SERPL CALC-MCNC: 62 MG/DL
MCH RBC QN AUTO: 30.1 PG (ref 26.5–33)
MCHC RBC AUTO-ENTMCNC: 33.5 G/DL (ref 31.5–36.5)
MCV RBC AUTO: 90 FL (ref 78–100)
NONHDLC SERPL-MCNC: 86 MG/DL
PLATELET # BLD AUTO: 221 10E3/UL (ref 150–450)
POTASSIUM SERPL-SCNC: 4.7 MMOL/L (ref 3.4–5.3)
PROT SERPL-MCNC: 7.9 G/DL (ref 6.4–8.3)
PSA SERPL DL<=0.01 NG/ML-MCNC: 1.12 NG/ML (ref 0–4.5)
RBC # BLD AUTO: 5.49 10E6/UL (ref 4.4–5.9)
SODIUM SERPL-SCNC: 142 MMOL/L (ref 135–145)
TRIGL SERPL-MCNC: 119 MG/DL
WBC # BLD AUTO: 6.5 10E3/UL (ref 4–11)

## 2025-03-03 PROCEDURE — G0103 PSA SCREENING: HCPCS | Performed by: PHYSICIAN ASSISTANT

## 2025-03-03 PROCEDURE — 99396 PREV VISIT EST AGE 40-64: CPT | Performed by: PHYSICIAN ASSISTANT

## 2025-03-03 PROCEDURE — 3075F SYST BP GE 130 - 139MM HG: CPT | Performed by: PHYSICIAN ASSISTANT

## 2025-03-03 PROCEDURE — 83036 HEMOGLOBIN GLYCOSYLATED A1C: CPT | Performed by: PHYSICIAN ASSISTANT

## 2025-03-03 PROCEDURE — 80061 LIPID PANEL: CPT | Performed by: PHYSICIAN ASSISTANT

## 2025-03-03 PROCEDURE — 85027 COMPLETE CBC AUTOMATED: CPT | Performed by: PHYSICIAN ASSISTANT

## 2025-03-03 PROCEDURE — 36415 COLL VENOUS BLD VENIPUNCTURE: CPT | Performed by: PHYSICIAN ASSISTANT

## 2025-03-03 PROCEDURE — 3079F DIAST BP 80-89 MM HG: CPT | Performed by: PHYSICIAN ASSISTANT

## 2025-03-03 PROCEDURE — 80053 COMPREHEN METABOLIC PANEL: CPT | Performed by: PHYSICIAN ASSISTANT

## 2025-03-03 RX ORDER — ROSUVASTATIN CALCIUM 20 MG/1
20 TABLET, COATED ORAL DAILY
Qty: 90 TABLET | Refills: 3 | Status: SHIPPED | OUTPATIENT
Start: 2025-03-03

## 2025-03-03 RX ORDER — LOSARTAN POTASSIUM 100 MG/1
100 TABLET ORAL DAILY
Qty: 90 TABLET | Refills: 3 | Status: SHIPPED | OUTPATIENT
Start: 2025-03-03

## 2025-03-03 SDOH — HEALTH STABILITY: PHYSICAL HEALTH: ON AVERAGE, HOW MANY DAYS PER WEEK DO YOU ENGAGE IN MODERATE TO STRENUOUS EXERCISE (LIKE A BRISK WALK)?: 7 DAYS

## 2025-03-03 SDOH — HEALTH STABILITY: PHYSICAL HEALTH: ON AVERAGE, HOW MANY MINUTES DO YOU ENGAGE IN EXERCISE AT THIS LEVEL?: 60 MIN

## 2025-03-03 ASSESSMENT — ENCOUNTER SYMPTOMS
FEVER: 0
CHILLS: 0
COLOR CHANGE: 0
SORE THROAT: 0
HEMATURIA: 0
VOMITING: 0
ABDOMINAL PAIN: 0
PALPITATIONS: 0
COUGH: 0
RHINORRHEA: 1
ARTHRALGIAS: 0
EYE PAIN: 0
SEIZURES: 0
DYSURIA: 0
SHORTNESS OF BREATH: 0
BACK PAIN: 0

## 2025-03-03 ASSESSMENT — SOCIAL DETERMINANTS OF HEALTH (SDOH): HOW OFTEN DO YOU GET TOGETHER WITH FRIENDS OR RELATIVES?: ONCE A WEEK

## 2025-03-03 NOTE — LETTER
March 4, 2025      Tim Rutherford  [address] [address]        Dear ,    We are writing to inform you of your test results.    Complete Blood Count: White blood cell, red blood cell, platelets, and hemoglobin are stable.   Metabolic panel : Kidney function is down a but but not overly concerning. Make sure you are staying well hydrated.  Liver function and electrolytes are stable.    Lipids: Lipid panel is stable but elevated.  Looks much better on medications. No changes at this time.   PSA: Your prostate screening lab was within normal limit   A1c is high at 6.0 but stable from last year. You are prediabetic. Cut out cars and sugars and make sure you are staying active. Weight loss will also help with lowering your blood sugars.     Follow up in clinic in 12 months.     Resulted Orders   CBC with platelets   Result Value Ref Range    WBC Count 6.5 4.0 - 11.0 10e3/uL    RBC Count 5.49 4.40 - 5.90 10e6/uL    Hemoglobin 16.5 13.3 - 17.7 g/dL    Hematocrit 49.2 40.0 - 53.0 %    MCV 90 78 - 100 fL    MCH 30.1 26.5 - 33.0 pg    MCHC 33.5 31.5 - 36.5 g/dL    RDW 12.8 10.0 - 15.0 %    Platelet Count 221 150 - 450 10e3/uL   Comprehensive metabolic panel (BMP + Alb, Alk Phos, ALT, AST, Total. Bili, TP)   Result Value Ref Range    Sodium 142 135 - 145 mmol/L    Potassium 4.7 3.4 - 5.3 mmol/L    Carbon Dioxide (CO2) 25 22 - 29 mmol/L    Anion Gap 11 7 - 15 mmol/L    Urea Nitrogen 16.6 8.0 - 23.0 mg/dL    Creatinine 1.24 (H) 0.67 - 1.17 mg/dL    GFR Estimate 65 >60 mL/min/1.73m2      Comment:      eGFR calculated using 2021 CKD-EPI equation.    Calcium 10.1 8.8 - 10.4 mg/dL    Chloride 106 98 - 107 mmol/L    Glucose 106 (H) 70 - 99 mg/dL    Alkaline Phosphatase 39 (L) 40 - 150 U/L    AST 30 0 - 45 U/L    ALT 36 0 - 70 U/L    Protein Total 7.9 6.4 - 8.3 g/dL    Albumin 4.6 3.5 - 5.2 g/dL    Bilirubin Total 0.5 <=1.2 mg/dL    Patient Fasting > 8hrs? Yes    Lipid panel reflex to direct LDL Fasting    Result Value Ref Range    Cholesterol 137 <200 mg/dL    Triglycerides 119 <150 mg/dL    Direct Measure HDL 51 >=40 mg/dL    LDL Cholesterol Calculated 62 <100 mg/dL    Non HDL Cholesterol 86 <130 mg/dL    Patient Fasting > 8hrs? Yes     Narrative    Cholesterol  Desirable: < 200 mg/dL  Borderline High: 200 - 239 mg/dL  High: >= 240 mg/dL    Triglycerides  Normal: < 150 mg/dL  Borderline High: 150 - 199 mg/dL  High: 200-499 mg/dL  Very High: >= 500 mg/dL    Direct Measure HDL  Female: >= 50 mg/dL   Male: >= 40 mg/dL    LDL Cholesterol  Desirable: < 100 mg/dL  Above Desirable: 100 - 129 mg/dL   Borderline High: 130 - 159 mg/dL   High:  160 - 189 mg/dL   Very High: >= 190 mg/dL    Non HDL Cholesterol  Desirable: < 130 mg/dL  Above Desirable: 130 - 159 mg/dL  Borderline High: 160 - 189 mg/dL  High: 190 - 219 mg/dL  Very High: >= 220 mg/dL   Hemoglobin A1c   Result Value Ref Range    Estimated Average Glucose 126 (H) <117 mg/dL    Hemoglobin A1C 6.0 (H) 0.0 - 5.6 %      Comment:      Normal <5.7%   Prediabetes 5.7-6.4%    Diabetes 6.5% or higher     Note: Adopted from ADA consensus guidelines.   PSA, screen   Result Value Ref Range    Prostate Specific Antigen Screen 1.12 0.00 - 4.50 ng/mL    Narrative    This result is obtained using the Roche Elecsys total PSA method on the oscar e801 immunoassay analyzer, which is an ultrasensitive method. Results obtained with different assay methods or kits cannot be used interchangeably.  This test is intended for initial prostate cancer screening. PSA values exceeding the age-specific limits are suspicious for prostate disease, but additional testing, such as prostate biopsy, is needed to diagnose prostate pathology. The American Cancer Society recommends annual examination with digital rectal examination and serum PSA beginning at age 50 and for men with a life expectancy of at least 10 years after detection of prostate cancer. For men in high-risk groups, such as   Americans or men with a first-degree relative diagnosed at a younger age, testing should begin at a younger age. It is generally recommended that information be provided to patients about the benefits and limitations of testing and treatment so they can make informed decisions.       If you have any questions or concerns, please call the clinic at the number listed above.       Sincerely,      Tolu Coyne PA-C    Electronically signed

## 2025-03-03 NOTE — PROGRESS NOTES
Preventive Care Visit  [facility]  Tolu Coyne PA-C, Family Medicine  Mar 3, 2025      Assessment & Plan     Annual physical exam  Overall doing well.  Healthy lifestyle discussed    Essential hypertension, benign  Pressure borderline at 138/88.  Will have him continue losartan 100 mg daily.  We discussed lifestyle modifications including increasing activity, watching diet, limiting salt and alcohol.  He is to continue to monitor blood pressures at home.  If blood pressures are starting to go greater than 140/90 he is to let me know  - CBC with platelets; Future  - Comprehensive metabolic panel (BMP + Alb, Alk Phos, ALT, AST, Total. Bili, TP); Future  - losartan (COZAAR) 100 MG tablet; Take 1 tablet (100 mg) by mouth daily.  Recent Labs   Lab Test 07/31/23  1117 07/11/22  1409   CHOL 220* 245*   HDL 50 43   * 116*   TRIG 174* 672*     Essential Hypercholesterolemia  Started Crestor 20 mg daily July 2023.  Has not had lipid panel rechecked.  He is fasting today.  Will repeat a lipid panel today.  Medications refilled.  He is to continue to watch diet and stay active  - Lipid panel reflex to direct LDL Fasting; Future  - rosuvastatin (CRESTOR) 20 MG tablet; Take 1 tablet (20 mg) by mouth daily.    Erectile dysfunction, unspecified erectile dysfunction type  Using tadalafil as needed    Pre-diabetes  Last A1c 7/23 elevated at 6.0.  Randy diet and activity level.  He has been watching his diet and taking more walks.  Will update an A1c today  - Hemoglobin A1c; Future  Hemoglobin A1C   Date Value Ref Range Status   07/31/2023 6.0 (H) 0.0 - 5.6 % Final     Comment:     Normal <5.7%   Prediabetes 5.7-6.4%    Diabetes 6.5% or higher     Note: Adopted from ADA consensus guidelines.     Screening for prostate cancer  - PSA, screen; Future    Colon cancer screening  Up-to-date on colon cancer screening    Sinus congestion  Chronic nasal congestion for many years.  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  He has been taking Benadryl 6 tablets a day and nasal rinses this has been helpful.  Continues to have some nasal congestion.  Recommended decreasing Benadryl to 3 tablets daily, starting Flonase, also could consider switching to over-the-counter antihistamine including Zyrtec, Claritin, Allegra if decreasing Benadryl to 3 tablets daily makes his symptoms worse.  He would like to see ENT.  Referral placed  - Adult ENT  Referral; Future    Class 2 severe obesity with body mass index (BMI) of 35 to 39.9 with serious comorbidity (H)  BMI today is 35.  Continue to watch diet and stay active.     Follow-up Visit   Expected date:  Mar 03, 2026 (Approximate)      Follow Up Appointment Details:     Follow-up with whom?: Me    Follow-Up for what?: Adult Preventive    How?: In Person                   Patient has been advised of split billing requirements and indicates understanding: Yes        BMI  Estimated body mass index is 35.31 kg/m  as calculated from the following:    Height as of this encounter: 1.88 m (6' 2\").    Weight as of this encounter: 124.7 kg (275 lb).   Weight management plan: Discussed healthy diet and exercise guidelines    Counseling  Appropriate preventive services were addressed with this patient via screening, questionnaire, or discussion as appropriate for fall prevention, nutrition, physical activity, Tobacco-use cessation, social engagement, weight loss and cognition.  Checklist reviewing preventive services available has been given to the patient.  Reviewed patient's diet, addressing concerns and/or questions.         Quynh Dumont is a 64 year old, presenting for the following:  Physical        3/3/2025     8:50 AM   Additional Questions   Roomed by MYLENE Sykes          Tim is a 64-year-old male presents the clinic today for an annual physical.  Past medical history significant for hypertension, hyperlipidemia, prediabetes, erectile dysfunction, and " chronic nasal congestion.  Overall he is feeling well.    Last year we did start him on Crestor 20 mg daily for elevated lipid panel.  He has been taking this daily without any side effect    For many years he states he has had lots of nasal congestion and sinus pressure.  Recently started Benadryl and nasal rinses and this has helped.  He works with a lot of sawdust and is using a mask but not daily.  He states he is taking 6 Benadryl tablets a day.      Advance Care Planning  Patient does not have a Health Care Directive:       3/3/2025   General Health   How would you rate your overall physical health? Excellent   Feel stress (tense, anxious, or unable to sleep) Not at all         3/3/2025   Nutrition   Three or more servings of calcium each day? Yes   Diet: Regular (no restrictions)   How many servings of fruit and vegetables per day? (!) 2-3   How many sweetened beverages each day? (!) 4+         3/3/2025   Exercise   Days per week of moderate/strenous exercise 7 days   Average minutes spent exercising at this level 60 min         3/3/2025   Social Factors   Frequency of gathering with friends or relatives Once a week   Worry food won't last until get money to buy more No   Food not last or not have enough money for food? No   Do you have housing? (Housing is defined as stable permanent housing and does not include staying ouside in a car, in a tent, in an abandoned building, in an overnight shelter, or couch-surfing.) Yes   Are you worried about losing your housing? No   Lack of transportation? No   Unable to get utilities (heat,electricity)? No         3/3/2025   Fall Risk   Fallen 2 or more times in the past year? No   Trouble with walking or balance? No          3/3/2025   Dental   Dentist two times every year? Yes            Today's PHQ-2 Score:       3/3/2025     8:48 AM   PHQ-2 ( 1999 [employer])   Q1: Little interest or pleasure in doing things 0   Q2: Feeling down, depressed or hopeless 0   PHQ-2 Score 0   "  Q1: Little interest or pleasure in doing things Not at all   Q2: Feeling down, depressed or hopeless Not at all   PHQ-2 Score 0       Patient-reported           3/3/2025   Substance Use   Alcohol more than 3/day or more than 7/wk Not Applicable   Do you use any other substances recreationally? No     Social History     Tobacco Use    Smoking status: Never     Passive exposure: Never    Smokeless tobacco: Never   Vaping Use    Vaping status: Never Used   Substance Use Topics    Alcohol use: Not Currently     Comment: Alcoholic Drinks/day: less than 1 beer/week    Drug use: Never             3/3/2025   One time HIV Screening   Previous HIV test? No         3/3/2025   STI Screening   New sexual partner(s) since last STI/HIV test? No   Last PSA:   Prostate Specific Antigen Screen   Date Value Ref Range Status   07/31/2023 1.03 0.00 - 4.50 ng/mL Final   03/20/2017 0.9 0.0 - 3.5 ng/mL Final     ASCVD Risk   The 10-year ASCVD risk score (Steph GALLO, et al., 2019) is: 15.8%    Values used to calculate the score:      Age: 64 years      Sex: Male      Is Non- : No      Diabetic: No      Tobacco smoker: No      Systolic Blood Pressure: 134 mmHg      Is BP treated: Yes      HDL Cholesterol: 50 mg/dL      Total Cholesterol: 220 mg/dL         Reviewed and updated as needed this visit by Provider                    Past Medical History:   Diagnosis Date    Essential hypertension, malignant     Essential hypertension, malignant     Hypercholesterolemia     Hypercholesterolemia     Nasal septal deviation     Nasal septal deviation     PONV (postoperative nausea and vomiting)      Past Surgical History:   Procedure Laterality Date    COLONOSCOPY      HERNIORRHAPHY UMBILICAL N/A 4/9/2024    Procedure: HERNIORRHAPHY, UMBILICAL, OPEN;  Surgeon: Goldy Paz DO;  Location: [facility] OR    WISDOM TOOTH EXTRACTION      \"teens\"     BP Readings from Last 3 Encounters:   03/03/25 138/88 " "  04/09/24 134/88   03/25/24 138/88    Wt Readings from Last 3 Encounters:   03/03/25 124.7 kg (275 lb)   04/09/24 122.5 kg (270 lb)   03/25/24 122.5 kg (270 lb)                  Patient Active Problem List   Diagnosis    Class 2 severe obesity with body mass index (BMI) of 35 to 39.9 with serious comorbidity (H)    Essential Hypercholesterolemia    Kidney stone    Elevated BP    Hx of adenomatous colonic polyps    Erectile dysfunction, unspecified erectile dysfunction type    Benign neoplasm of descending colon    Umbilical hernia without obstruction and without gangrene     Past Surgical History:   Procedure Laterality Date    COLONOSCOPY      HERNIORRHAPHY UMBILICAL N/A 4/9/2024    Procedure: HERNIORRHAPHY, UMBILICAL, OPEN;  Surgeon: Goldy Paz DO;  Location: [facility] OR    WISDOM TOOTH EXTRACTION      \"teens\"       Social History     Tobacco Use    Smoking status: Never     Passive exposure: Never    Smokeless tobacco: Never   Substance Use Topics    Alcohol use: Not Currently     Comment: Alcoholic Drinks/day: less than 1 beer/week     Family History   Problem Relation Age of Onset    Hyperlipidemia Mother     Glaucoma Mother     No Known Problems Father         bladder cancer    Alcoholism Paternal Grandfather     Lymphoma Daughter         hodgkins    Anesthesia Reaction No family hx of     Malignant Hyperthermia No family hx of          Current Outpatient Medications   Medication Sig Dispense Refill    losartan (COZAAR) 100 MG tablet Take 1 tablet (100 mg) by mouth daily. 90 tablet 3    Multiple Vitamins-Minerals (CENTRUM SILVER ADULT 50+) TABS Take 1 tablet by mouth every 24 hours      rosuvastatin (CRESTOR) 20 MG tablet Take 1 tablet (20 mg) by mouth daily. 90 tablet 3    tadalafil (CIALIS) 20 MG tablet Take 1 tablet (20 mg) by mouth daily as needed (sexual activity) 30 tablet 4     Recent Labs   Lab Test 03/25/24  1022 07/31/23  1117 07/11/22  1409 08/10/21  0904 08/10/21  0904 " "12/28/20  1048 08/24/20  1017 08/24/20  1017   A1C  --  6.0*  --   --   --   --   --   --    LDL  --  135* 116*  --  151*  --   --  125   HDL  --  50 43  --  51  --   --  54   TRIG  --  174* 672*  --  110  --   --  154*   ALT  --  20 25  --   --   --   --  16   CR 1.15 1.11 1.03  --  1.13 1.16  --  1.01   GFRESTIMATED 72 75 82  --  70 >60   < > >60   GFRESTBLACK  --   --   --   --   --  >60  --  >60   POTASSIUM 4.5 4.4 4.4   < > 4.4 4.7  --  4.3    < > = values in this interval not displayed.          Review of Systems  Review of Systems   Constitutional:  Negative for chills and fever.   HENT:  Positive for congestion and rhinorrhea. Negative for ear pain and sore throat.    Eyes:  Negative for pain and visual disturbance.   Respiratory:  Negative for cough and shortness of breath.    Cardiovascular:  Negative for chest pain and palpitations.   Gastrointestinal:  Negative for abdominal pain and vomiting.   Genitourinary:  Negative for dysuria and hematuria.   Musculoskeletal:  Negative for arthralgias and back pain.   Skin:  Negative for color change and rash.   Neurological:  Negative for seizures and syncope.   All other systems reviewed and are negative.         Objective    Exam  Temp 97.9  F (36.6  C)   Resp 17   Ht 1.88 m (6' 2\")   BMI 34.67 kg/m     Estimated body mass index is 34.67 kg/m  as calculated from the following:    Height as of this encounter: 1.88 m (6' 2\").    Weight as of 4/9/24: 122.5 kg (270 lb).    Physical Exam  Vitals and nursing note reviewed.   Constitutional:       General: He is not in acute distress.     Appearance: Normal appearance. He is well-developed and well-groomed. He is not ill-appearing or toxic-appearing.   HENT:      Head: Normocephalic and atraumatic.      Right Ear: Tympanic membrane, ear canal and external ear normal.      Left Ear: Tympanic membrane, ear canal and external ear normal.      Nose: Congestion and rhinorrhea present. No nasal tenderness.      " Mouth/Throat:      Lips: Pink.      Mouth: Mucous membranes are moist.      Palate: No mass.      Pharynx: Oropharynx is clear. Uvula midline.      Tonsils: No tonsillar exudate or tonsillar abscesses.   Eyes:      General: Lids are normal.         Right eye: No discharge.         Left eye: No discharge.   Neck:      Trachea: Trachea normal.   Cardiovascular:      Rate and Rhythm: Normal rate and regular rhythm.      Heart sounds: S1 normal and S2 normal. No murmur heard.  Pulmonary:      Effort: Pulmonary effort is normal.      Breath sounds: Normal breath sounds and air entry.   Abdominal:      General: Bowel sounds are normal. There is no distension.      Palpations: Abdomen is soft.      Tenderness: There is no abdominal tenderness. There is no guarding or rebound.   Musculoskeletal:      Cervical back: Full passive range of motion without pain and neck supple.      Right lower leg: No edema.      Left lower leg: No edema.   Lymphadenopathy:      Cervical: No cervical adenopathy.   Skin:     General: Skin is warm and dry.      Findings: No lesion or rash.   Neurological:      General: No focal deficit present.      Mental Status: He is alert.      Cranial Nerves: No cranial nerve deficit.      Gait: Gait is intact.      Deep Tendon Reflexes:      Reflex Scores:       Patellar reflexes are 2+ on the right side and 2+ on the left side.  Psychiatric:         Attention and Perception: Attention normal.         Mood and Affect: Mood normal.         Speech: Speech normal.         Signed Electronically by: Tolu Coyne PA-C